# Patient Record
Sex: FEMALE | Race: WHITE | Employment: OTHER | URBAN - METROPOLITAN AREA
[De-identification: names, ages, dates, MRNs, and addresses within clinical notes are randomized per-mention and may not be internally consistent; named-entity substitution may affect disease eponyms.]

---

## 2018-03-17 LAB
CREATININE, EXTERNAL: 0.92
HBA1C MFR BLD HPLC: 5.7 %
LDL-C, EXTERNAL: 68

## 2018-07-24 ENCOUNTER — OFFICE VISIT (OUTPATIENT)
Dept: ENDOCRINOLOGY | Age: 78
End: 2018-07-24

## 2018-07-24 VITALS
WEIGHT: 96 LBS | RESPIRATION RATE: 16 BRPM | DIASTOLIC BLOOD PRESSURE: 61 MMHG | HEART RATE: 66 BPM | TEMPERATURE: 97.8 F | SYSTOLIC BLOOD PRESSURE: 147 MMHG | BODY MASS INDEX: 17.66 KG/M2 | HEIGHT: 62 IN

## 2018-07-24 DIAGNOSIS — E06.3 HYPOTHYROIDISM DUE TO HASHIMOTO'S THYROIDITIS: ICD-10-CM

## 2018-07-24 DIAGNOSIS — E06.3 HYPOTHYROIDISM DUE TO HASHIMOTO'S THYROIDITIS: Primary | ICD-10-CM

## 2018-07-24 DIAGNOSIS — E03.8 HYPOTHYROIDISM DUE TO HASHIMOTO'S THYROIDITIS: Primary | ICD-10-CM

## 2018-07-24 DIAGNOSIS — E03.8 HYPOTHYROIDISM DUE TO HASHIMOTO'S THYROIDITIS: ICD-10-CM

## 2018-07-24 RX ORDER — LIOTHYRONINE SODIUM 50 UG/1
75 TABLET ORAL DAILY
COMMUNITY
End: 2018-07-24 | Stop reason: DRUGHIGH

## 2018-07-24 RX ORDER — LIOTHYRONINE SODIUM 5 UG/1
5 TABLET ORAL DAILY
Qty: 30 TAB | Refills: 6 | Status: SHIPPED | OUTPATIENT
Start: 2018-07-24 | End: 2018-07-24 | Stop reason: SDUPTHER

## 2018-07-24 RX ORDER — BISOPROLOL FUMARATE 5 MG/1
5 TABLET ORAL DAILY
COMMUNITY
End: 2021-11-15

## 2018-07-24 RX ORDER — LEVOTHYROXINE SODIUM 50 UG/1
50 TABLET ORAL
Qty: 45 TAB | Refills: 6 | Status: SHIPPED | OUTPATIENT
Start: 2018-07-24 | End: 2018-07-24 | Stop reason: SDUPTHER

## 2018-07-24 RX ORDER — LEVOTHYROXINE SODIUM 50 UG/1
50 TABLET ORAL
Qty: 45 TAB | Refills: 6 | Status: SHIPPED | OUTPATIENT
Start: 2018-07-24 | End: 2018-10-17

## 2018-07-24 RX ORDER — LIOTHYRONINE SODIUM 5 UG/1
5 TABLET ORAL DAILY
Qty: 30 TAB | Refills: 6 | Status: SHIPPED | OUTPATIENT
Start: 2018-07-24 | End: 2018-10-17 | Stop reason: SDUPTHER

## 2018-07-24 NOTE — PROGRESS NOTES
Chief Complaint   Patient presents with    New Patient    Thyroid Problem     Pt feels that Dr Kelly Gerard is making too many changes in her thyroid medications, and would like a second opinion

## 2018-07-24 NOTE — MR AVS SNAPSHOT
[address] [facility] [address] 
[phone] Patient: Asael Anand MRN: [id number] MSD:2/85/[id number] Visit Information Date & Time Provider Department Dept. Phone Encounter #  
 7/24/2018 11:00 AM Autumn Munoz MD [facility] Diabetes & Endocrinology [phone] [id number] Upcoming Health Maintenance Date Due HEMOGLOBIN A1C Q6M 1942 LIPID PANEL Q1 1942 FOOT EXAM Q1 9/10/1952 MICROALBUMIN Q1 9/10/1952 EYE EXAM RETINAL OR DILATED Q1 9/10/1952 DTaP/Tdap/Td series (1 - Tdap) 9/10/1963 ZOSTER VACCINE AGE 60> 7/10/2002 GLAUCOMA SCREENING Q2Y 9/10/2007 Bone Densitometry (Dexa) Screening 9/10/2007 Pneumococcal 65+ Low/Medium Risk (1 of 2 - PCV13) 9/10/2007 MEDICARE YEARLY EXAM 3/14/2018 Influenza Age 5 to Adult 8/1/2018 Allergies as of 7/24/2018  Review Complete On: 7/24/2018 By: Autumn Munoz MD  
  
 Severity Noted Reaction Type Reactions Aspirin  11/05/2010    Other (comments) Codeine  11/05/2010    Other (comments) Morphine  11/05/2010    Other (comments) Pcn [Penicillins]  11/05/2010    Other (comments) Current Immunizations  Never Reviewed No immunizations on file. Not reviewed this visit You Were Diagnosed With   
  
 Codes Comments Hypothyroidism due to Hashimoto's thyroiditis    -  Primary ICD-10-CM: E03.8, E06.3 ICD-9-CM: 244.8, 245.2 Vitals BP Pulse Temp Resp Height(growth percentile) Weight(growth percentile) 147/61 (BP 1 Location: Left arm, BP Patient Position: Sitting) 66 97.8 °F (36.6 °C) (Oral) 16 5' 2\" (1.575 m) 96 lb (43.5 kg) BMI OB Status Smoking Status 17.56 kg/m2 Hysterectomy Current Every Day Smoker BMI and BSA Data Body Mass Index Body Surface Area  
 17.56 kg/m 2 1.38 m 2 Preferred Pharmacy Pharmacy Name Phone  [facility] [address] [facility] [address] [phone] Your Updated Medication List  
  
   
This list is accurate as of 7/24/18 11:59 AM.  Always use your most recent med list.  
  
  
  
  
 bisoprolol 5 mg tablet Commonly known as:  Arby Wenceslao Take 5 mg by mouth daily. JANUVIA 100 mg tablet Generic drug:  SITagliptin Take 100 mg by mouth daily. levothyroxine 50 mcg tablet Commonly known as:  SYNTHROID Take 1 Tab by mouth Daily (before breakfast). But on fridays, saturdays and sundays  Take 2 pills  
  
 liothyronine 5 mcg tablet Commonly known as:  CYTOMEL Take 1 Tab by mouth daily. Stop 50 mcg cytomel NEXIUM PO Take 40 mg by mouth. PLAVIX 75 mg Tab Generic drug:  clopidogrel Take  by mouth daily. Ramipril 2.5 mg Tab Take 2.5 mg by mouth daily. VITAMIN B-12 PO Take  by mouth. Prescriptions Sent to Pharmacy Refills  
 levothyroxine (SYNTHROID) 50 mcg tablet 6 Sig: Take 1 Tab by mouth Daily (before breakfast). But on fridays, saturdays and sundays  Take 2 pills Class: Normal  
 Pharmacy: [address] Ph #: [phone] Route: Oral  
 liothyronine (CYTOMEL) 5 mcg tablet 6 Sig: Take 1 Tab by mouth daily. Stop 50 mcg cytomel Class: Normal  
 Pharmacy: [address] Ph #: [phone] Route: Oral  
  
Patient Instructions Start on   50 mcg  Synthroid   One pill daily, but on  Friday, sat, sun   2 pills Along with cytomel 5 mcg a day  
on empty stomach with water only, no other meds or food or drinks   For next half hour Take any kind of vitamins, calcium, iron   Pills  4 hours later Introducing [facility]! Bhavana Mayer introduces [email or web address] patient portal. Now you can access parts of your medical record, email your doctor's office, and request medication refills online. 1. In your internet browser, go to [email or web address] 2. Click on the First Time User? Click Here link in the Sign In box. You will see the New Member Sign Up page. 3. Enter your [employer] Access Code exactly as it appears below. You will not need to use this code after youve completed the sign-up process. If you do not sign up before the expiration date, you must request a new code. · [employer] Access Code: [email or web address] Expires: 10/22/2018 11:59 AM 
 
4. Enter the last four digits of your Social Security Number (xxxx) and Date of Birth (mm/dd/yyyy) as indicated and click Submit. You will be taken to the next sign-up page. 5. Create a [employer] ID. This will be your [employer] login ID and cannot be changed, so think of one that is secure and easy to remember. 6. Create a [employer] password. You can change your password at any time. 7. Enter your Password Reset Question and Answer. This can be used at a later time if you forget your password. 8. Enter your e-mail address. You will receive e-mail notification when new information is available in [address] 9. Click Sign Up. You can now view and download portions of your medical record. 10. Click the Download Summary menu link to download a portable copy of your medical information. If you have questions, please visit the Frequently Asked Questions section of the [employer] website. Remember, [employer] is NOT to be used for urgent needs. For medical emergencies, dial 911. Now available from your iPhone and Android! Please provide this summary of care documentation to your next provider. Your primary care clinician is listed as Leslie Traore. If you have any questions after today's visit, please call [phone].

## 2018-07-24 NOTE — PATIENT INSTRUCTIONS
Start on   50 mcg  Synthroid   One pill daily, but on  Friday, sat, sun   2 pills   Along with cytomel 5 mcg a day   on empty stomach with water only, no other meds or food or drinks   For next half hour     Take any kind of vitamins, calcium, iron   Pills  4 hours later

## 2018-07-24 NOTE — PROGRESS NOTES
HISTORY OF PRESENT ILLNESS  Jackson Wong is a 78 y.o. female. HPI  Initial visit for hypothyroidism management     Pt has been on cytomel 50 mcg bid   She had labs done periodically and she had the cytomel and synthroid adjusted regularly     She is c/o inability to gain weight   She denies any other hyperthyroid symptoms     She had cad , denies any anginal pain , sob      Past Medical History:   Diagnosis Date    CAD (coronary artery disease)     Diabetes ([facility] [address] [facility]     Essential hypertension     Gastroesophageal reflux disease     Hyperlipidemia     Hypothyroid     Myocardial infarction ([facility]     Osteoarthritis     Peptic ulcer disease     Vitamin B12 deficiency        Social History     Social History    Marital status:      Spouse name: N/A    Number of children: N/A    Years of education: N/A     Occupational History    Not on file. Social History Main Topics    Smoking status: Current Every Day Smoker    Smokeless tobacco: Never Used    Alcohol use No    Drug use: No    Sexual activity: Not on file     Other Topics Concern    Not on file     Social History Narrative       Family History   Problem Relation Age of Onset    Heart Attack Mother     Hypertension Mother     Diabetes Mother     Colon Cancer Father     Heart Surgery Father        Review of Systems   Constitutional: Positive for malaise/fatigue and weight loss. HENT: Negative. Eyes: Negative for pain and redness. Respiratory: Negative. Cardiovascular: Negative for chest pain, palpitations and leg swelling. Gastrointestinal: Negative. Negative for constipation. Genitourinary: Negative. Musculoskeletal: Negative for myalgias. Skin: Negative. Neurological: Negative. Endo/Heme/Allergies: Negative. Psychiatric/Behavioral: Negative for depression and memory loss. The patient does not have insomnia. Physical Exam   Constitutional: She is oriented to person, place, and time.  She appears well-developed and well-nourished. HENT:   Head: Normocephalic. Eyes: Conjunctivae and EOM are normal. Pupils are equal, round, and reactive to light. Neck: Normal range of motion. Neck supple. No JVD present. No tracheal deviation present. No thyromegaly present. Cardiovascular: Normal rate, regular rhythm and normal heart sounds. No murmur heard. Pulmonary/Chest: Breath sounds normal.   Abdominal: Soft. Bowel sounds are normal.   Musculoskeletal: Normal range of motion. Lymphadenopathy:     She has no cervical adenopathy. Neurological: She is alert and oriented to person, place, and time. She has normal reflexes. Skin: Skin is warm. Psychiatric: She has a normal mood and affect. Reviewed all the labs from pcp    ASSESSMENT and PLAN    1.  Hypothyroidism : pt has long been on cytomel , current dose is 50 mcg twice a day   She says her body is intolerant of synthroid   She says she is not allergic to it     HER TSH levels are very fluctuant -   July 19 2018   TSh was 36.7   June 11 2018   TSH was 0.618   March 2018 was  11.27   Feb 2018 TSH was 0.262  TSH was 55 from dec 2017       After long conversation , she got convinced to try  Synthroid plain 50 mcg dose pill ( chose this only to avoid any potential dye allergies )  Calc dose by her body weight   And dropped cytomel down to 5 mcg a day only     Reassured her as transition is going to be [facility] her on proper intake and will f/u on labs more regularly     > 50 % of time is spent on counseling   Patient voiced understanding her plan of care

## 2018-08-14 ENCOUNTER — OP HISTORICAL/CONVERTED ENCOUNTER (OUTPATIENT)
Dept: OTHER | Age: 78
End: 2018-08-14

## 2018-09-06 ENCOUNTER — HOSPITAL ENCOUNTER (OUTPATIENT)
Dept: LAB | Age: 78
Discharge: HOME | End: 2018-09-06
Payer: MEDICARE

## 2018-09-06 ENCOUNTER — OFFICE VISIT (OUTPATIENT)
Dept: ENDOCRINOLOGY | Age: 78
End: 2018-09-06

## 2018-09-06 VITALS
DIASTOLIC BLOOD PRESSURE: 57 MMHG | HEART RATE: 68 BPM | TEMPERATURE: 97.2 F | WEIGHT: 101.6 LBS | BODY MASS INDEX: 18.69 KG/M2 | RESPIRATION RATE: 18 BRPM | OXYGEN SATURATION: 96 % | HEIGHT: 62 IN | SYSTOLIC BLOOD PRESSURE: 150 MMHG

## 2018-09-06 DIAGNOSIS — E03.4 HYPOTHYROIDISM DUE TO ACQUIRED ATROPHY OF THYROID: Primary | ICD-10-CM

## 2018-09-06 PROCEDURE — 84439 ASSAY OF FREE THYROXINE: CPT

## 2018-09-06 PROCEDURE — 36415 COLL VENOUS BLD VENIPUNCTURE: CPT

## 2018-09-06 PROCEDURE — 84443 ASSAY THYROID STIM HORMONE: CPT

## 2018-09-06 PROCEDURE — 86376 MICROSOMAL ANTIBODY EACH: CPT

## 2018-09-06 NOTE — PROGRESS NOTES
1. Have you been to the ER, urgent care clinic since your last visit? No  Hospitalized since your last visit? NO 
 
2. Have you seen or consulted any other health care providers outside of the [facility] since your last visit? No 
 
Wt Readings from Last 3 Encounters:  
09/06/18 101 lb 9.6 oz (46.1 kg) 07/24/18 96 lb (43.5 kg) 05/24/11 116 lb (52.6 kg) Temp Readings from Last 3 Encounters:  
09/06/18 97.2 °F (36.2 °C) (Oral)  
07/24/18 97.8 °F (36.6 °C) (Oral) BP Readings from Last 3 Encounters:  
09/06/18 150/57  
07/24/18 147/61  
05/24/11 92/48 Pulse Readings from Last 3 Encounters:  
09/06/18 68  
07/24/18 66  
05/24/11 88

## 2018-09-06 NOTE — PROGRESS NOTES
HISTORY OF PRESENT ILLNESS Emilee Rubio is a 78 y.o. female. HPI First f/u after Initial visit for hypothyroidism management   From July 2018 She feels not good at all She is not taking the meds as prescribed , got confused But gladly toleratign synthroid Old history Pt has been on cytomel 50 mcg bid She had labs done periodically and she had the cytomel and synthroid adjusted regularly She is c/o inability to gain weight She denies any other hyperthyroid symptoms She had cad , denies any anginal pain , sob Past Medical History:  
Diagnosis Date  CAD (coronary artery disease)  Diabetes ([facility] [address]  Essential hypertension  Gastroesophageal reflux disease  Hyperlipidemia  Hypothyroid  Myocardial infarction ([facility] [address] [facility]  Osteoarthritis  Peptic ulcer disease  Vitamin B12 deficiency Social History Social History  Marital status:  Spouse name: N/A  
 Number of children: N/A  
 Years of education: N/A Occupational History  Not on file. Social History Main Topics  Smoking status: Current Every Day Smoker  Smokeless tobacco: Never Used  Alcohol use No  
 Drug use: No  
 Sexual activity: Not on file Other Topics Concern  Not on file Social History Narrative Family History Problem Relation Age of Onset  Heart Attack Mother  Hypertension Mother  Diabetes Mother  Colon Cancer Father  Heart Surgery Father Review of Systems Constitutional: Positive for malaise/fatigue and weight loss. HENT: Negative. Eyes: Negative for pain and redness. Respiratory: Negative. Cardiovascular: Negative for chest pain, palpitations and leg swelling. Gastrointestinal: Negative. Negative for constipation. Genitourinary: Negative. Musculoskeletal: Negative for myalgias. Skin: Negative. Neurological: Negative. Endo/Heme/Allergies: Negative. Psychiatric/Behavioral: Negative for depression and memory loss. The patient does not have insomnia. Physical Exam  
Constitutional: She is oriented to person, place, and time. She appears well-developed and well-nourished. HENT:  
Head: Normocephalic. Eyes: Conjunctivae and EOM are normal. Pupils are equal, round, and reactive to light. Neck: Normal range of motion. Neck supple. No JVD present. No tracheal deviation present. No thyromegaly present. Cardiovascular: Normal rate, regular rhythm and normal heart sounds. No murmur heard. Pulmonary/Chest: Breath sounds normal.  
Abdominal: Soft. Bowel sounds are normal.  
Musculoskeletal: Normal range of motion. Lymphadenopathy:  
  She has no cervical adenopathy. Neurological: She is alert and oriented to person, place, and time. She has normal reflexes. Skin: Skin is warm. Psychiatric: She has a normal mood and affect. Reviewed all the labs from pcp ASSESSMENT and PLAN 1. Hypothyroidism : pt has long been on cytomel , she took  50 mcg twice a day until she came to see me in July 2018 She says her body is intolerant of synthroid She says she is not allergic to it but to be on safe side, I used only plain 50 mcg pills This visit, sept 2018 - she got confused and has been using synthroid 50 mcg for 4 days and cytomel 5 for 3 days ( lower than given dose ) HER TSH levels are very fluctuant -  
July 19 2018   TSh was 36.7 June 11 2018   TSH was 0.618 March 2018 was  11.27 Feb 2018 TSH was 0.262 TSH was 55 from dec 2017 After long conversation , she got convinced to try  Synthroid plain 50 mcg dose pill ( chose this only to avoid any potential dye allergies ) Calc dose by her body weight 75 mcg a day Synthroid 10 pills a week = 71 mcg a day plus  dropped cytomel down to 5 mcg a day Today, corrected her and I gave her pictorally the plan to take the meds Will change once she is EUTHYROID  
 
 Reassured her as transition is going to be [facility] Educated her on proper intake and will f/u on labs more regularly  
 
> 50 % of time is spent on counseling Patient voiced understanding her plan of care

## 2018-09-06 NOTE — PATIENT INSTRUCTIONS
-------------------------------------------------------------------------------------------- Refills    -    please call your pharmacy and have them send us a refill request 
 
Results  -  allow up to a week for lab results to be processed and reviewed. Phone calls  -  Allow upto 24 hrs. for non-urgent calls to be retained Prior authorization - It may take up to 2 weeks to process, depending on your insurance Forms  -  FMLA, DMV, patient assistance, etc. will take up to 2 weeks to process Cancellations - please notify the office in advance if you cannot keep your appointment Samples  - will only be dispensed at visits as supply is limited If you are having a medical emergency call 911 
 
-------------------------------------------------------------------------------------------- Start on   50 mcg  Synthroid   One pill daily, but on  Friday, sat, sun   2 pills Along with cytomel 5 mcg a day  
on empty stomach with water only, no other meds or food or drinks   For next half hour Take any kind of vitamins, calcium, iron   Pills  4 hours later

## 2018-09-06 NOTE — MR AVS SNAPSHOT
[address] [place] [address] 
[phone] Patient: Johnie Nagel MRN: [id number] GJZ:[birth year] Visit Information Date & Time Provider Department Dept. Phone Encounter #  
 9/6/2018  3:15 PM Afia Tabor MD [facility] Diabetes & Endocrinology [phone] [id number] Follow-up Instructions Return in about 6 weeks (around 10/18/2018). Upcoming Health Maintenance Date Due  
 FOOT EXAM Q1 9/10/1952 MICROALBUMIN Q1 9/10/1952 EYE EXAM RETINAL OR DILATED Q1 9/10/1952 DTaP/Tdap/Td series (1 - Tdap) 9/10/1963 ZOSTER VACCINE AGE 60> 7/10/2002 GLAUCOMA SCREENING Q2Y 9/10/2007 Bone Densitometry (Dexa) Screening 9/10/2007 Pneumococcal 65+ Low/Medium Risk (1 of 2 - PCV13) 9/10/2007 MEDICARE YEARLY EXAM 3/14/2018 Influenza Age 5 to Adult 8/1/2018 HEMOGLOBIN A1C Q6M 9/17/2018 LIPID PANEL Q1 3/17/2019 Allergies as of 9/6/2018  Review Complete On: 9/6/2018 By: Afia Tabor MD  
  
 Severity Noted Reaction Type Reactions Aspirin  11/05/2010    Other (comments) Codeine  11/05/2010    Other (comments) Morphine  11/05/2010    Other (comments) Pcn [Penicillins]  11/05/2010    Other (comments) Current Immunizations  Never Reviewed No immunizations on file. Not reviewed this visit Vitals BP Pulse Temp Resp Height(growth percentile) Weight(growth percentile) 150/57 (BP 1 Location: Right arm, BP Patient Position: Sitting) 68 97.2 °F (36.2 °C) (Oral) 18 5' 2\" (1.575 m) 101 lb 9.6 oz (46.1 kg) SpO2 BMI OB Status Smoking Status 96% 18.58 kg/m2 Hysterectomy Current Every Day Smoker BMI and BSA Data Body Mass Index Body Surface Area 18.58 kg/m 2 1.42 m 2 Preferred Pharmacy Pharmacy Name Phone [facility] [address] [phone] Your Updated Medication List  
  
   
 This list is accurate as of 9/6/18  3:43 PM.  Always use your most recent med list.  
  
  
  
  
 bisoprolol 5 mg tablet Commonly known as:  Thresea Tony Take 5 mg by mouth daily. JANUVIA 100 mg tablet Generic drug:  SITagliptin Take 100 mg by mouth daily. levothyroxine 50 mcg tablet Commonly known as:  SYNTHROID Take 1 Tab by mouth Daily (before breakfast). But on fridays, saturdays and sundays  Take 2 pills  
  
 liothyronine 5 mcg tablet Commonly known as:  CYTOMEL Take 1 Tab by mouth daily. Stop 50 mcg cytomel NEXIUM PO Take 40 mg by mouth. PLAVIX 75 mg Tab Generic drug:  clopidogrel Take  by mouth daily. Ramipril 2.5 mg Tab Take 2.5 mg by mouth daily. VITAMIN B-12 PO Take  by mouth. Follow-up Instructions Return in about 6 weeks (around 10/18/2018). Patient Instructions   
-------------------------------------------------------------------------------------------- Refills    -    please call your pharmacy and have them send us a refill request 
 
Results  -  allow up to a week for lab results to be processed and reviewed. Phone calls  -  Allow upto 24 hrs. for non-urgent calls to be retained Prior authorization - It may take up to 2 weeks to process, depending on your insurance Forms  -  FMLA, DMV, patient assistance, etc. will take up to 2 weeks to process Cancellations - please notify the office in advance if you cannot keep your appointment Samples  - will only be dispensed at visits as supply is limited If you are having a medical emergency call 911 
 
-------------------------------------------------------------------------------------------- Start on   50 mcg  Synthroid   One pill daily, but on  Friday, sat, sun   2 pills Along with cytomel 5 mcg a day  
on empty stomach with water only, no other meds or food or drinks   For next half hour Take any kind of vitamins, calcium, iron   Pills  4 hours later Introducing [facility]! [employer] introduces [employer] patient portal. Now you can access parts of your medical record, email your doctor's office, and request medication refills online. 1. In your internet browser, go to [email or web address] 2. Click on the First Time User? Click Here link in the Sign In box. You will see the New Member Sign Up page. 3. Enter your [employer] Access Code exactly as it appears below. You will not need to use this code after youve completed the sign-up process. If you do not sign up before the expiration date, you must request a new code. · [employer] Access Code: [email or web address] Expires: 10/22/2018 11:59 AM 
 
4. Enter the last four digits of your Social Security Number (xxxx) and Date of Birth (mm/dd/yyyy) as indicated and click Submit. You will be taken to the next sign-up page. 5. Create a [employer] ID. This will be your [employer] login ID and cannot be changed, so think of one that is secure and easy to remember. 6. Create a [employer] password. You can change your password at any time. 7. Enter your Password Reset Question and Answer. This can be used at a later time if you forget your password. 8. Enter your e-mail address. You will receive e-mail notification when new information is available in [address] 9. Click Sign Up. You can now view and download portions of your medical record. 10. Click the Download Summary menu link to download a portable copy of your medical information. If you have questions, please visit the Frequently Asked Questions section of the [employer] website. Remember, [employer] is NOT to be used for urgent needs. For medical emergencies, dial 911. Now available from your iPhone and Android! Please provide this summary of care documentation to your next provider. Your primary care clinician is listed as Edelmira Weaver.  If you have any questions after today's visit, please call [phone].

## 2018-09-07 DIAGNOSIS — E03.9 ACQUIRED HYPOTHYROIDISM: Primary | ICD-10-CM

## 2018-09-07 LAB
T4 FREE SERPL-MCNC: 0.54 NG/DL (ref 0.82–1.77)
THYROPEROXIDASE AB SERPL-ACNC: 18 IU/ML (ref 0–34)
TSH SERPL DL<=0.005 MIU/L-ACNC: 81.67 UIU/ML (ref 0.45–4.5)

## 2018-09-07 RX ORDER — LEVOTHYROXINE SODIUM 50 UG/1
50 TABLET ORAL
Qty: 30 TAB | Refills: 6 | Status: SHIPPED | OUTPATIENT
Start: 2018-09-07 | End: 2018-10-17 | Stop reason: SDUPTHER

## 2018-09-07 RX ORDER — LIOTHYRONINE SODIUM 5 UG/1
5 TABLET ORAL DAILY
Qty: 30 TAB | Refills: 6 | Status: SHIPPED | OUTPATIENT
Start: 2018-09-07 | End: 2018-09-07 | Stop reason: SDUPTHER

## 2018-09-07 RX ORDER — LEVOTHYROXINE SODIUM 50 UG/1
50 TABLET ORAL
Qty: 30 TAB | Refills: 6 | Status: SHIPPED | OUTPATIENT
Start: 2018-09-07 | End: 2018-09-07 | Stop reason: SDUPTHER

## 2018-09-07 RX ORDER — LIOTHYRONINE SODIUM 5 UG/1
5 TABLET ORAL DAILY
Qty: 30 TAB | Refills: 6 | Status: SHIPPED | OUTPATIENT
Start: 2018-09-07 | End: 2018-10-17 | Stop reason: SDUPTHER

## 2018-09-10 NOTE — PROGRESS NOTES
Inform pt that because of NOT TAKING THE MEDICATION, THE LEVELS HAVE GOTTEN WORSER  
 
EXPECTED, DISCUSSED WITH HER AGAIN ON HOW TO TAKE MEDS . Luz Robles AGAIN Afia Tabor MD

## 2018-10-10 ENCOUNTER — HOSPITAL ENCOUNTER (OUTPATIENT)
Dept: LAB | Age: 78
Discharge: HOME | End: 2018-10-10
Payer: MEDICARE

## 2018-10-10 PROCEDURE — 84439 ASSAY OF FREE THYROXINE: CPT

## 2018-10-10 PROCEDURE — 84443 ASSAY THYROID STIM HORMONE: CPT

## 2018-10-10 PROCEDURE — 36415 COLL VENOUS BLD VENIPUNCTURE: CPT

## 2018-10-10 PROCEDURE — 84481 FREE ASSAY (FT-3): CPT

## 2018-10-17 ENCOUNTER — OFFICE VISIT (OUTPATIENT)
Dept: ENDOCRINOLOGY | Age: 78
End: 2018-10-17

## 2018-10-17 VITALS
HEART RATE: 86 BPM | OXYGEN SATURATION: 97 % | SYSTOLIC BLOOD PRESSURE: 169 MMHG | RESPIRATION RATE: 18 BRPM | TEMPERATURE: 97.3 F | BODY MASS INDEX: 18.99 KG/M2 | WEIGHT: 103.2 LBS | DIASTOLIC BLOOD PRESSURE: 64 MMHG | HEIGHT: 62 IN

## 2018-10-17 DIAGNOSIS — E06.3 HYPOTHYROIDISM DUE TO HASHIMOTO'S THYROIDITIS: ICD-10-CM

## 2018-10-17 DIAGNOSIS — E03.8 HYPOTHYROIDISM DUE TO HASHIMOTO'S THYROIDITIS: ICD-10-CM

## 2018-10-17 RX ORDER — LEVOTHYROXINE SODIUM 50 UG/1
50 TABLET ORAL
Qty: 50 TAB | Refills: 6 | Status: SHIPPED | OUTPATIENT
Start: 2018-10-17 | End: 2018-12-19 | Stop reason: SDUPTHER

## 2018-10-17 RX ORDER — LIOTHYRONINE SODIUM 5 UG/1
5 TABLET ORAL DAILY
Qty: 30 TAB | Refills: 6 | Status: SHIPPED | OUTPATIENT
Start: 2018-10-17 | End: 2018-12-19 | Stop reason: SDUPTHER

## 2018-10-17 NOTE — PROGRESS NOTES
1. Have you been to the ER, urgent care clinic since your last visit? Hospitalized since your last visit? No 
 
2. Have you seen or consulted any other health care providers outside of the [address] [place] [address] since your last visit? Include any pap smears or colon screening. No  
 
Wt Readings from Last 3 Encounters:  
10/17/18 103 lb 3.2 oz (46.8 kg) 09/06/18 101 lb 9.6 oz (46.1 kg) 07/24/18 96 lb (43.5 kg) Temp Readings from Last 3 Encounters:  
10/17/18 97.3 °F (36.3 °C) (Oral) 09/06/18 97.2 °F (36.2 °C) (Oral)  
07/24/18 97.8 °F (36.6 °C) (Oral) BP Readings from Last 3 Encounters:  
10/17/18 169/64  
09/06/18 150/57  
07/24/18 147/61 Pulse Readings from Last 3 Encounters:  
10/17/18 86  
09/06/18 68  
07/24/18 66

## 2018-10-17 NOTE — PATIENT INSTRUCTIONS
-------------------------------------------------------------------------------------------- Refills    -    please call your pharmacy and have them send us a refill request 
 
Results  -  allow up to a week for lab results to be processed and reviewed. Phone calls  -  Allow upto 24 hrs. for non-urgent calls to be retained Prior authorization - It may take up to 4 weeks to process, depending on your insurance Forms  -  FMLA, DMV, patient assistance, etc. will take up to 2 weeks to process Cancellations - please notify the office in advance if you cannot keep your appointment Samples  - will only be dispensed at visits as supply is limited If you are having a medical emergency call 911 
 
-------------------------------------------------------------------------------------------- 
 
 
 
-------------------------------------------------------------------------------------------- Refills    -    please call your pharmacy and have them send us a refill request 
 
Results  -  allow up to a week for lab results to be processed and reviewed. Phone calls  -  Allow upto 24 hrs. for non-urgent calls to be retained Prior authorization - It may take up to 2 weeks to process, depending on your insurance Forms  -  FMLA, DMV, patient assistance, etc. will take up to 2 weeks to process Cancellations - please notify the office in advance if you cannot keep your appointment Samples  - will only be dispensed at visits as supply is limited If you are having a medical emergency call 911 
 
-------------------------------------------------------------------------------------------- 
 
INCREASE ON    50 mcg  Synthroid   One pill daily, but on  Thursday , Friday, Sat, Sun   2 pills Along with cytomel 5 mcg a day  
 
on empty stomach with water only, no other meds or food or drinks   For next half hour Take any kind of vitamins, calcium, iron   Pills  4 hours later

## 2018-10-17 NOTE — PROGRESS NOTES
HISTORY OF PRESENT ILLNESS Chago Medina is a 70 y.o. female. HPI 
 f/u after last  visit for hypothyroidism management   From Sept 6 2018 She does not feel good still Not able to sleep Has some weakness in her legs Old history : 
 
She feels not good at all She is not taking the meds as prescribed , got confused But gladly toleratign synthroid Old history Pt has been on cytomel 50 mcg bid She had labs done periodically and she had the cytomel and synthroid adjusted regularly She is c/o inability to gain weight She denies any other hyperthyroid symptoms She had cad , denies any anginal pain , sob Past Medical History:  
Diagnosis Date  CAD (coronary artery disease)  Diabetes ([facility]  Essential hypertension  Gastroesophageal reflux disease  Hyperlipidemia  Hypothyroid  Myocardial infarction ([facility]  Osteoarthritis  Peptic ulcer disease  Vitamin B12 deficiency Social History Socioeconomic History  Marital status:  Spouse name: Not on file  Number of children: Not on file  Years of education: Not on file  Highest education level: Not on file Social Needs  Financial resource strain: Not on file  Food insecurity - worry: Not on file  Food insecurity - inability: Not on file  Transportation needs - medical: Not on file  Transportation needs - non-medical: Not on file Occupational History  Not on file Tobacco Use  Smoking status: Current Every Day Smoker  Smokeless tobacco: Never Used Substance and Sexual Activity  Alcohol use: No  
 Drug use: No  
 Sexual activity: Not on file Other Topics Concern  Not on file Social History Narrative  Not on file Family History Problem Relation Age of Onset  Heart Attack Mother  Hypertension Mother  Diabetes Mother  Colon Cancer Father  Heart Surgery Father Review of Systems Constitutional: Positive for malaise/fatigue and weight loss. HENT: Negative. Eyes: Negative for pain and redness. Respiratory: Negative. Cardiovascular: Negative for chest pain, palpitations and leg swelling. Gastrointestinal: Negative. Negative for constipation. Genitourinary: Negative. Musculoskeletal: Negative for myalgias. Skin: Negative. Neurological: Negative. Endo/Heme/Allergies: Negative. Psychiatric/Behavioral: Negative for depression and memory loss. The patient does not have insomnia. Physical Exam  
Constitutional: She is oriented to person, place, and time. She appears well-developed and well-nourished. HENT:  
Head: Normocephalic. Eyes: Conjunctivae and EOM are normal. Pupils are equal, round, and reactive to light. Neck: Normal range of motion. Neck supple. No JVD present. No tracheal deviation present. No thyromegaly present. Cardiovascular: Normal rate, regular rhythm and normal heart sounds. No murmur heard. Pulmonary/Chest: Breath sounds normal.  
Abdominal: Soft. Bowel sounds are normal.  
Musculoskeletal: Normal range of motion. Lymphadenopathy:  
  She has no cervical adenopathy. Neurological: She is alert and oriented to person, place, and time. She has normal reflexes. Skin: Skin is warm. Psychiatric: She has a normal mood and affect. Lab Results Component Value Date/Time TSH 16.220 (H) 10/10/2018 10:47 AM  
 Triiodothyronine (T3), free 2.5 10/10/2018 10:47 AM  
 T4, Free 0.80 (L) 10/10/2018 10:47 AM  
  
 
 
ASSESSMENT and PLAN 1. Hypothyroidism : pt has long been on cytomel , she took  50 mcg twice a day until she came to see me in July 2018 She says her body is intolerant of synthroid She says she is not allergic to it but to be on safe side, I used only plain 50 mcg pills This visit, sept 2018 - she got confused and has been using synthroid 50 mcg for 4 days and cytomel 5 for 3 days ( lower than given dose ) HER TSH levels are very fluctuant -  
July 19 2018   TSh was 36.7 June 11 2018   TSH was 0.618 March 2018 was  11.27 Feb 2018 TSH was 0.262 TSH was 55 from dec 2017 After long conversation , she got convinced to try  Synthroid plain 50 mcg dose pill ( chose this only to avoid any potential dye allergies ) Calc dose by her body weight 75 mcg a day Synthroid 10 pills a week = 71 mcg a day plus  I dropped cytomel down to 5 mcg a day July 2018 visit , corrected her and I gave her pictorally the plan to take the meds Reassured her as transition is going to be [facility] Educated her on proper intake Oct 2018 : her TSH is down to 16  On the above dosage Still continuing on synthroid 50 mcg dose, but increased to an extra pill 4 days a week That means synthroid of 78 mcg plus cytomel of 5 mcg dose  
 
> 50 % of time is spent on counseling Patient voiced understanding her plan of care

## 2018-12-15 ENCOUNTER — HOSPITAL ENCOUNTER (OUTPATIENT)
Dept: LAB | Age: 78
Discharge: HOME | End: 2018-12-15
Payer: MEDICARE

## 2018-12-15 PROCEDURE — 84439 ASSAY OF FREE THYROXINE: CPT

## 2018-12-15 PROCEDURE — 36415 COLL VENOUS BLD VENIPUNCTURE: CPT

## 2018-12-15 PROCEDURE — 84443 ASSAY THYROID STIM HORMONE: CPT

## 2018-12-16 LAB
T4 FREE SERPL-MCNC: 0.99 NG/DL (ref 0.82–1.77)
TSH SERPL DL<=0.005 MIU/L-ACNC: 21.02 UIU/ML (ref 0.45–4.5)

## 2018-12-19 ENCOUNTER — OFFICE VISIT (OUTPATIENT)
Dept: ENDOCRINOLOGY | Age: 78
End: 2018-12-19

## 2018-12-19 VITALS
DIASTOLIC BLOOD PRESSURE: 55 MMHG | BODY MASS INDEX: 19.54 KG/M2 | SYSTOLIC BLOOD PRESSURE: 153 MMHG | OXYGEN SATURATION: 97 % | HEART RATE: 67 BPM | TEMPERATURE: 95.5 F | RESPIRATION RATE: 18 BRPM | WEIGHT: 106.2 LBS | HEIGHT: 62 IN

## 2018-12-19 DIAGNOSIS — E06.3 HYPOTHYROIDISM DUE TO HASHIMOTO'S THYROIDITIS: ICD-10-CM

## 2018-12-19 DIAGNOSIS — E03.8 HYPOTHYROIDISM DUE TO HASHIMOTO'S THYROIDITIS: ICD-10-CM

## 2018-12-19 DIAGNOSIS — E03.8 HYPOTHYROIDISM DUE TO HASHIMOTO'S THYROIDITIS: Primary | ICD-10-CM

## 2018-12-19 DIAGNOSIS — E06.3 HYPOTHYROIDISM DUE TO HASHIMOTO'S THYROIDITIS: Primary | ICD-10-CM

## 2018-12-19 RX ORDER — LIOTHYRONINE SODIUM 5 UG/1
5 TABLET ORAL DAILY
Qty: 90 TAB | Refills: 4 | Status: SHIPPED | OUTPATIENT
Start: 2018-12-19 | End: 2019-03-19 | Stop reason: SDUPTHER

## 2018-12-19 RX ORDER — LEVOTHYROXINE SODIUM 50 UG/1
100 TABLET ORAL
Qty: 180 TAB | Refills: 4 | Status: SHIPPED | OUTPATIENT
Start: 2018-12-19 | End: 2019-03-19 | Stop reason: SDUPTHER

## 2018-12-19 NOTE — PATIENT INSTRUCTIONS
INCREASE ON  2 pills  50 mcg  Synthroid   One pill daily ( haleigh short  )   Along with cytomel 5 mcg a day ( mail order )    on empty stomach with water only, no other meds or food or drinks   For next half hour     Take any kind of vitamins, calcium, iron   Pills  4 hours later

## 2018-12-19 NOTE — PROGRESS NOTES
HISTORY OF PRESENT ILLNESS  Yoel Escobar is a 70 y.o. female. HPI   f/u after last  visit for hypothyroidism management   From Oct  2018      She does  feel good   Has sleep issues still     Compliant with meds, c/o cost   She is being patient.         Old history :    She feels not good at all   She is not taking the meds as prescribed , got confused   But gladly toleratign synthroid       Old history     Pt has been on cytomel 50 mcg bid   She had labs done periodically and she had the cytomel and synthroid adjusted regularly     She is c/o inability to gain weight   She denies any other hyperthyroid symptoms     She had cad , denies any anginal pain , sob      Past Medical History:   Diagnosis Date    CAD (coronary artery disease)     Diabetes ([facility]     Essential hypertension     Gastroesophageal reflux disease     Hyperlipidemia     Hypothyroid     Myocardial infarction ([facility]     Osteoarthritis     Peptic ulcer disease     Vitamin B12 deficiency        Social History     Socioeconomic History    Marital status:      Spouse name: Not on file    Number of children: Not on file    Years of education: Not on file    Highest education level: Not on file   Social Needs    Financial resource strain: Not on file    Food insecurity - worry: Not on file    Food insecurity - inability: Not on file   [employer] needs - medical: Not on file   [employer] needs - non-medical: Not on file   Occupational History    Not on file   Tobacco Use    Smoking status: Current Every Day Smoker    Smokeless tobacco: Never Used   Substance and Sexual Activity    Alcohol use: No    Drug use: No    Sexual activity: Not on file   Other Topics Concern    Not on file   Social History Narrative    Not on file       Family History   Problem Relation Age of Onset    Heart Attack Mother     Hypertension Mother     Diabetes Mother     Colon Cancer Father     Heart Surgery Father        Review of Systems   Constitutional: Positive for malaise/fatigue and weight loss. HENT: Negative. Eyes: Negative for pain and redness. Respiratory: Negative. Cardiovascular: Negative for chest pain, palpitations and leg swelling. Gastrointestinal: Negative. Negative for constipation. Genitourinary: Negative. Musculoskeletal: Negative for myalgias. Skin: Negative. Neurological: Negative. Endo/Heme/Allergies: Negative. Psychiatric/Behavioral: Negative for depression and memory loss. The patient does not have insomnia. Physical Exam   Constitutional: She is oriented to person, place, and time. She appears well-developed and well-nourished. HENT:   Head: Normocephalic. Eyes: Conjunctivae and EOM are normal. Pupils are equal, round, and reactive to light. Neck: Normal range of motion. Neck supple. No JVD present. No tracheal deviation present. No thyromegaly present. Cardiovascular: Normal rate, regular rhythm and normal heart sounds. No murmur heard. Pulmonary/Chest: Breath sounds normal.   Abdominal: Soft. Bowel sounds are normal.   Musculoskeletal: Normal range of motion. Lymphadenopathy:     She has no cervical adenopathy. Neurological: She is alert and oriented to person, place, and time. She has normal reflexes. Skin: Skin is warm. Psychiatric: She has a normal mood and affect. Lab Results   Component Value Date/Time    TSH 21.020 (H) 12/15/2018 08:59 AM    Triiodothyronine (T3), free 2.5 10/10/2018 10:47 AM    T4, Free 0.99 12/15/2018 08:59 AM          ASSESSMENT and PLAN    1.  Hypothyroidism : pt has long been on cytomel , she took  50 mcg twice a day until she came to see me in July 2018   She says her body is intolerant of synthroid   She says she is not allergic to it but to be on safe side, I used only plain 50 mcg pills     This visit, sept 2018 - she got confused and has been using synthroid 50 mcg for 4 days and cytomel 5 for 3 days ( lower than given dose )      HER TSH levels are very fluctuant -   Dec 2018 : TSH is 21   Oct 11 2018 :TSH is 16   Sept 2018 :  TSH was 81   July 19 2018   TSh was 36.7   June 11 2018   TSH was 0.618   March 2018 was  11.27   Feb 2018 TSH was 0.262  TSH was 55 from dec 2017       After long conversation , she got convinced to try  Synthroid plain 50 mcg dose pill ( chose this only to avoid any potential dye allergies )  Calc dose by her body weight 75 mcg a day   Synthroid 10 pills a week = 71 mcg a day plus  I dropped cytomel down to 5 mcg a day      July 2018 visit , corrected her and I gave her pictorally the plan to take the meds   Reassured her as transition is going to be [facility] her on proper intake       Oct 2018 : her TSH is down to 16  On the above dosage  Still continuing on synthroid 50 mcg dose, but increased to an extra pill 4 days a week    That means synthroid of 78 mcg plus cytomel of 5 mcg dose       Dec 2018 :  TSH is 21   Increase synthroid to 50 mcg 2 pills daily  Along with  cytomel 5 mcg a day     > 50 % of time is spent on counseling   Patient voiced understanding her plan of care

## 2018-12-19 NOTE — PROGRESS NOTES
1. Have you been to the ER, urgent care clinic since your last visit? No  Hospitalized since your last visit? No    2. Have you seen or consulted any other health care providers outside of the [address] [place] [address] since your last visit? Include any pap smears or colon screening.  No     Wt Readings from Last 3 Encounters:   12/19/18 106 lb 3.2 oz (48.2 kg)   10/17/18 103 lb 3.2 oz (46.8 kg)   09/06/18 101 lb 9.6 oz (46.1 kg)     Temp Readings from Last 3 Encounters:   12/19/18 95.5 °F (35.3 °C) (Oral)   10/17/18 97.3 °F (36.3 °C) (Oral)   09/06/18 97.2 °F (36.2 °C) (Oral)     BP Readings from Last 3 Encounters:   12/19/18 153/55   10/17/18 169/64   09/06/18 150/57     Pulse Readings from Last 3 Encounters:   12/19/18 67   10/17/18 86   09/06/18 68

## 2019-02-18 ENCOUNTER — HOSPITAL ENCOUNTER (OUTPATIENT)
Dept: LAB | Age: 79
Discharge: HOME | End: 2019-02-18
Payer: MEDICARE

## 2019-02-18 PROCEDURE — 36415 COLL VENOUS BLD VENIPUNCTURE: CPT

## 2019-02-18 PROCEDURE — 84439 ASSAY OF FREE THYROXINE: CPT

## 2019-02-18 PROCEDURE — 84481 FREE ASSAY (FT-3): CPT

## 2019-02-18 PROCEDURE — 84443 ASSAY THYROID STIM HORMONE: CPT

## 2019-02-19 LAB
T3FREE SERPL-MCNC: 3.4 PG/ML (ref 2–4.4)
T4 FREE SERPL-MCNC: 1.37 NG/DL (ref 0.82–1.77)
TSH SERPL DL<=0.005 MIU/L-ACNC: 2.25 UIU/ML (ref 0.45–4.5)

## 2019-03-19 ENCOUNTER — OFFICE VISIT (OUTPATIENT)
Dept: ENDOCRINOLOGY | Age: 79
End: 2019-03-19

## 2019-03-19 VITALS
HEART RATE: 64 BPM | BODY MASS INDEX: 19.8 KG/M2 | DIASTOLIC BLOOD PRESSURE: 64 MMHG | TEMPERATURE: 97.1 F | OXYGEN SATURATION: 98 % | SYSTOLIC BLOOD PRESSURE: 157 MMHG | HEIGHT: 62 IN | WEIGHT: 107.6 LBS | RESPIRATION RATE: 18 BRPM

## 2019-03-19 DIAGNOSIS — E06.3 HYPOTHYROIDISM DUE TO HASHIMOTO'S THYROIDITIS: Primary | ICD-10-CM

## 2019-03-19 DIAGNOSIS — E03.8 HYPOTHYROIDISM DUE TO HASHIMOTO'S THYROIDITIS: ICD-10-CM

## 2019-03-19 DIAGNOSIS — E06.3 HYPOTHYROIDISM DUE TO HASHIMOTO'S THYROIDITIS: ICD-10-CM

## 2019-03-19 DIAGNOSIS — E03.8 HYPOTHYROIDISM DUE TO HASHIMOTO'S THYROIDITIS: Primary | ICD-10-CM

## 2019-03-19 DIAGNOSIS — M81.0 SENILE OSTEOPOROSIS: ICD-10-CM

## 2019-03-19 RX ORDER — LOSARTAN POTASSIUM 50 MG/1
TABLET ORAL DAILY
COMMUNITY
End: 2021-11-15

## 2019-03-19 RX ORDER — LEVOTHYROXINE SODIUM 50 UG/1
100 TABLET ORAL
Qty: 180 TAB | Refills: 4 | Status: SHIPPED | OUTPATIENT
Start: 2019-03-19 | End: 2019-07-02 | Stop reason: SDUPTHER

## 2019-03-19 RX ORDER — LIOTHYRONINE SODIUM 5 UG/1
5 TABLET ORAL DAILY
Qty: 90 TAB | Refills: 4 | Status: SHIPPED | OUTPATIENT
Start: 2019-03-19 | End: 2019-07-02 | Stop reason: SDUPTHER

## 2019-03-19 NOTE — PROGRESS NOTES
HISTORY OF PRESENT ILLNESS Nick Gardner is a 70 y.o. female. HPI 
 f/u after last  visit for hypothyroidism management   From december 2018 Gained 1 lb COMPLIANT  WITH MEDS  
SHE FEELS HORMONES ARE OFF Old history She does  feel good Has sleep issues still Compliant with meds, c/o cost  
She is being patient. Old history : 
 
She feels not good at all She is not taking the meds as prescribed , got confused But gladly toleratign synthroid Old history Pt has been on cytomel 50 mcg bid She had labs done periodically and she had the cytomel and synthroid adjusted regularly She is c/o inability to gain weight She denies any other hyperthyroid symptoms She had cad , denies any anginal pain , sob Past Medical History:  
Diagnosis Date  CAD (coronary artery disease)  Diabetes ([facility]  Essential hypertension  Gastroesophageal reflux disease  Hyperlipidemia  Hypothyroid  Myocardial infarction ([facility]  Osteoarthritis  Peptic ulcer disease  Vitamin B12 deficiency Social History Socioeconomic History  Marital status:  Spouse name: Not on file  Number of children: Not on file  Years of education: Not on file  Highest education level: Not on file Social Needs  Financial resource strain: Not on file  Food insecurity - worry: Not on file  Food insecurity - inability: Not on file  Transportation needs - medical: Not on file  Transportation needs - non-medical: Not on file Occupational History  Not on file Tobacco Use  Smoking status: Current Every Day Smoker  Smokeless tobacco: Never Used Substance and Sexual Activity  Alcohol use: No  
 Drug use: No  
 Sexual activity: Not on file Other Topics Concern  Not on file Social History Narrative  Not on file Family History Problem Relation Age of Onset  Heart Attack Mother  Hypertension Mother  Diabetes Mother  Colon Cancer Father  Heart Surgery Father Review of Systems Constitutional: Positive for malaise/fatigue and weight loss. HENT: Negative. Eyes: Negative for pain and redness. Respiratory: Negative. Cardiovascular: Negative for chest pain, palpitations and leg swelling. Gastrointestinal: Negative. Negative for constipation. Genitourinary: Negative. Musculoskeletal: Negative for myalgias. Skin: Negative. Neurological: Negative. Endo/Heme/Allergies: Negative. Psychiatric/Behavioral: Negative for depression and memory loss. The patient does not have insomnia. Physical Exam  
Constitutional: She is oriented to person, place, and time. She appears well-developed and well-nourished. HENT:  
Head: Normocephalic. Eyes: Conjunctivae and EOM are normal. Pupils are equal, round, and reactive to light. Neck: Normal range of motion. Neck supple. No JVD present. No tracheal deviation present. No thyromegaly present. Cardiovascular: Normal rate, regular rhythm and normal heart sounds. No murmur heard. Pulmonary/Chest: Breath sounds normal.  
Abdominal: Soft. Bowel sounds are normal.  
Musculoskeletal: Normal range of motion. Lymphadenopathy:  
  She has no cervical adenopathy. Neurological: She is alert and oriented to person, place, and time. She has normal reflexes. Skin: Skin is warm. Psychiatric: She has a normal mood and affect. Lab Results Component Value Date/Time TSH 2.250 02/18/2019 01:26 PM  
 Triiodothyronine (T3), free 3.4 02/18/2019 01:26 PM  
 T4, Free 1.37 02/18/2019 01:26 PM  
  
 
 
ASSESSMENT and PLAN 1. Hypothyroidism : pt has long been on cytomel , she took  50 mcg twice a day until she came to see me in July 2018 She says her body is intolerant of synthroid She says she is not allergic to it but to be on safe side, I AM USING only plain 50 mcg pills HER TSH levels are very fluctuant -  
 Feb 2019 : TSH is 2.2 Dec 2018 : TSH is 21 Oct 11 2018 :TSH is 16 Sept 2018 :  TSH was 81 July 19 2018   TSh was 36.7 June 11 2018   TSH was 0.618 March 2018 was  11.27 Feb 2018 TSH was 0.262 TSH was 55 from dec 2017 After long conversation , she got convinced to try  Synthroid plain 50 mcg dose pill ( chose this only to avoid any potential dye allergies ) Calc dose by her body weight 75 mcg a day Synthroid 10 pills a week = 71 mcg a day plus  I dropped cytomel down to 5 mcg a day July 2018 visit , corrected her and I gave her pictorally the plan to take the meds Reassured her as transition is going to be [facility] Educated her on proper intake  
 
 sept 2018 - she got confused and has been using synthroid 50 mcg for 4 days and cytomel 5 for 3 days ( lower than given dose ) Oct 2018 : her TSH is down to 16  On the above dosage Still continuing on synthroid 50 mcg dose, but increased to an extra pill 4 days a week That means synthroid of 78 mcg plus cytomel of 5 mcg dose Dec 2018 :  TSH is 21 Increase synthroid to 50 mcg 2 pills daily  Along with  cytomel 5 mcg a day FEB 2019  : TSH   IS   2.2 SHE IS EUTHYROID - HURRAY AND SHE WILL CONTINUE ON SYNTHROID  50 MCG 2 PILLS A DAY  AND CYTOMEL 5 MCG A DAY SENILE OSTEOPOROSIS  : WILL DO DEXA DISCUSSED PROLIA  
 
 
> 50 % of time is spent on counseling Patient voiced understanding her plan of care

## 2019-03-19 NOTE — PROGRESS NOTES
1. Have you been to the ER, urgent care clinic since your last visit? Hospitalized since your last visit? No 
 
2. Have you seen or consulted any other health care providers outside of the [address] [place] [address] since your last visit? Include any pap smears or colon screening. No  
Chief Complaint Patient presents with  Thyroid Problem  
  followup Learning assessment complete Wt Readings from Last 3 Encounters:  
03/19/19 107 lb 9.6 oz (48.8 kg) 12/19/18 106 lb 3.2 oz (48.2 kg) 10/17/18 103 lb 3.2 oz (46.8 kg) Temp Readings from Last 3 Encounters:  
03/19/19 97.1 °F (36.2 °C) (Oral) 12/19/18 95.5 °F (35.3 °C) (Oral) 10/17/18 97.3 °F (36.3 °C) (Oral) BP Readings from Last 3 Encounters:  
03/19/19 157/64  
12/19/18 153/55  
10/17/18 169/64 Pulse Readings from Last 3 Encounters:  
03/19/19 64  
12/19/18 67  
10/17/18 86 Abuse Screening Questionnaire 3/19/2019 Do you ever feel afraid of your partner? Eliecer Seda Are you in a relationship with someone who physically or mentally threatens you? Eliecernelida Stack Is it safe for you to go home?  Maranda Henry

## 2019-03-19 NOTE — LETTER
3/20/19 Patient: Haleigh Lynne YOB: 1940 Date of Visit: 3/19/2019 Thomas Muhammad, [address] [facility] [id number] VIA Facsimile: [phone] Dear Thomas Muhammad MD, Thank you for referring Ms. Alla Johnson to [facility] for evaluation. My notes for this consultation are attached. If you have questions, please do not hesitate to call me. I look forward to following your patient along with you. Sincerely, So Garcia MD

## 2019-03-19 NOTE — PATIENT INSTRUCTIONS
Stay on   2 pills  50 mcg  Synthroid   Daily  ( haleigh short  ) Along with cytomel 5 mcg a day ( mail order ) 
 
on empty stomach with water only, no other meds or food or drinks   For next half hour Take any kind of vitamins, calcium, iron   Pills  4 hours later

## 2019-04-06 ENCOUNTER — DOCUMENTATION ONLY (OUTPATIENT)
Dept: ENDOCRINOLOGY | Age: 79
End: 2019-04-06

## 2019-04-07 NOTE — PROGRESS NOTES
Date : march 20 2019  Bone DEXA  ap spine T-score -3.9; left femoral Neck T- score  -3.7, right femoral neck T-score n/a       Inform pt that bone quality is very poor   Could benefit from daily shots like forteo     Can talk to her further about it if she is coming in sooner for vosit

## 2019-04-08 NOTE — PROGRESS NOTES
Spoke to patient. Informed her of bone quality and the benefits of taking Forteo. Patient verbalized understanding and also states she can not come in any sooner but will discuss Forteo at her July visit.

## 2019-04-11 DIAGNOSIS — M81.0 SENILE OSTEOPOROSIS: ICD-10-CM

## 2019-05-29 ENCOUNTER — HOSPITAL ENCOUNTER (OUTPATIENT)
Dept: LAB | Age: 79
Discharge: HOME | End: 2019-05-29
Payer: MEDICARE

## 2019-05-29 PROCEDURE — 36415 COLL VENOUS BLD VENIPUNCTURE: CPT

## 2019-05-29 PROCEDURE — 84439 ASSAY OF FREE THYROXINE: CPT

## 2019-05-29 PROCEDURE — 82306 VITAMIN D 25 HYDROXY: CPT

## 2019-05-29 PROCEDURE — 84443 ASSAY THYROID STIM HORMONE: CPT

## 2019-05-30 LAB
25(OH)D3+25(OH)D2 SERPL-MCNC: 23.1 NG/ML (ref 30–100)
T4 FREE SERPL-MCNC: 1.28 NG/DL (ref 0.82–1.77)
TSH SERPL DL<=0.005 MIU/L-ACNC: 3.64 UIU/ML (ref 0.45–4.5)

## 2019-07-02 ENCOUNTER — OFFICE VISIT (OUTPATIENT)
Dept: ENDOCRINOLOGY | Age: 79
End: 2019-07-02

## 2019-07-02 VITALS
OXYGEN SATURATION: 98 % | RESPIRATION RATE: 18 BRPM | DIASTOLIC BLOOD PRESSURE: 55 MMHG | TEMPERATURE: 97 F | HEIGHT: 62 IN | HEART RATE: 68 BPM | SYSTOLIC BLOOD PRESSURE: 144 MMHG | WEIGHT: 107.5 LBS | BODY MASS INDEX: 19.78 KG/M2

## 2019-07-02 DIAGNOSIS — E03.8 HYPOTHYROIDISM DUE TO HASHIMOTO'S THYROIDITIS: ICD-10-CM

## 2019-07-02 DIAGNOSIS — M81.0 SENILE OSTEOPOROSIS: ICD-10-CM

## 2019-07-02 DIAGNOSIS — E06.3 HYPOTHYROIDISM DUE TO HASHIMOTO'S THYROIDITIS: ICD-10-CM

## 2019-07-02 DIAGNOSIS — E06.3 HYPOTHYROIDISM DUE TO HASHIMOTO'S THYROIDITIS: Primary | ICD-10-CM

## 2019-07-02 DIAGNOSIS — E03.8 HYPOTHYROIDISM DUE TO HASHIMOTO'S THYROIDITIS: Primary | ICD-10-CM

## 2019-07-02 RX ORDER — LEVOTHYROXINE SODIUM 50 UG/1
100 TABLET ORAL
Qty: 180 TAB | Refills: 4 | Status: SHIPPED | OUTPATIENT
Start: 2019-07-02

## 2019-07-02 RX ORDER — LIOTHYRONINE SODIUM 5 UG/1
5 TABLET ORAL DAILY
Qty: 90 TAB | Refills: 4 | Status: SHIPPED | OUTPATIENT
Start: 2019-07-02

## 2019-07-02 NOTE — PROGRESS NOTES
HISTORY OF PRESENT ILLNESS  Zoë Schuster is a 70 y.o. female. HPI   f/u after last  visit for hypothyroidism management   From march 2019   Stable weight   COMPLIANT  WITH MEDS   SHE FEELS fine        Old history     She does  feel good   Has sleep issues still     Compliant with meds, c/o cost   She is being patient.         Old history :    She feels not good at all   She is not taking the meds as prescribed , got confused   But gladly toleratign synthroid       Old history     Pt has been on cytomel 50 mcg bid   She had labs done periodically and she had the cytomel and synthroid adjusted regularly     She is c/o inability to gain weight   She denies any other hyperthyroid symptoms     She had cad , denies any anginal pain , sob      Past Medical History:   Diagnosis Date    CAD (coronary artery disease)     Diabetes ([address]     Essential hypertension     Gastroesophageal reflux disease     Hyperlipidemia     Hypothyroid     Myocardial infarction ([address]     Osteoarthritis     Peptic ulcer disease     Vitamin B12 deficiency        Social History     Socioeconomic History    Marital status:      Spouse name: Not on file    Number of children: Not on file    Years of education: Not on file    Highest education level: Not on file   Occupational History    Not on file   Social Needs    Financial resource strain: Not on file    Food insecurity:     Worry: Not on file     Inability: Not on file    Transportation needs:     Medical: Not on file     Non-medical: Not on file   Tobacco Use    Smoking status: Current Every Day Smoker    Smokeless tobacco: Never Used   Substance and Sexual Activity    Alcohol use: No    Drug use: No    Sexual activity: Not on file   Lifestyle    Physical activity:     Days per week: Not on file     Minutes per session: Not on file    Stress: Not on file   Relationships    Social connections:     Talks on phone: Not on file     Gets together: Not on file Attends Yarsanism service: Not on file     Active member of club or organization: Not on file     Attends meetings of clubs or organizations: Not on file     Relationship status: Not on file    Intimate partner violence:     Fear of current or ex partner: Not on file     Emotionally abused: Not on file     Physically abused: Not on file     Forced sexual activity: Not on file   Other Topics Concern    Not on file   Social History Narrative    Not on file       Family History   Problem Relation Age of Onset    Heart Attack Mother     Hypertension Mother     Diabetes Mother     Colon Cancer Father     Heart Surgery Father        Review of Systems   Constitutional: Positive for malaise/fatigue and weight loss. HENT: Negative. Eyes: Negative for pain and redness. Respiratory: Negative. Cardiovascular: Negative for chest pain, palpitations and leg swelling. Gastrointestinal: Negative. Negative for constipation. Genitourinary: Negative. Musculoskeletal: Negative for myalgias. Skin: Negative. Neurological: Negative. Endo/Heme/Allergies: Negative. Psychiatric/Behavioral: Negative for depression and memory loss. The patient does not have insomnia. Physical Exam   Constitutional: She is oriented to person, place, and time. She appears well-developed and well-nourished. HENT:   Head: Normocephalic. Eyes: Conjunctivae and EOM are normal. Pupils are equal, round, and reactive to light. Neck: Normal range of motion. Neck supple. No JVD present. No tracheal deviation present. No thyromegaly present. Cardiovascular: Normal rate, regular rhythm and normal heart sounds. No murmur heard. Pulmonary/Chest: Breath sounds normal.   Abdominal: Soft. Bowel sounds are normal.   Musculoskeletal: Normal range of motion. Lymphadenopathy:     She has no cervical adenopathy. Neurological: She is alert and oriented to person, place, and time. She has normal reflexes. Skin: Skin is warm. Psychiatric: She has a normal mood and affect. Lab Results   Component Value Date/Time    TSH 3.640 05/29/2019 08:01 AM    Triiodothyronine (T3), free 3.4 02/18/2019 01:26 PM    T4, Free 1.28 05/29/2019 08:01 AM      May 2019  : TSH is 3.6    Feb 2019 : TSH is 2.2  Dec 2018 : TSH is 21   Oct 11 2018 :TSH is 16   Sept 2018 :  TSH was 81   July 19 2018   TSh was 36.7   June 11 2018   TSH was 0.618   March 2018 was  11.27   Feb 2018 TSH was 0.262  TSH was 55 from dec 2017     ASSESSMENT and PLAN    1.  Hypothyroidism : pt has long been on cytomel , she took  50 mcg twice a day until she came to see me in July 2018   She says her body is intolerant of synthroid   She says she is not allergic to it but to be on safe side, I AM USING only plain 50 mcg  SYNTHROID pills   After long conversation , she got convinced to try  Synthroid plain 50 mcg dose pill ( chose this only to avoid any potential dye allergies )  Calc dose by her body weight 75 mcg a day   Synthroid 10 pills a week = 71 mcg a day plus  I dropped cytomel down to 5 mcg a day      July 2018 visit , corrected her and I gave her pictorally the plan to take the meds   Reassured her as transition is going to be [facility] her on proper intake      sept 2018 - she got confused and has been using synthroid 50 mcg for 4 days and cytomel 5 for 3 days ( lower than given dose )      Oct 2018 : her TSH is down to 16  On the above dosage  Still continuing on synthroid 50 mcg dose, but increased to an extra pill 4 days a week    That means synthroid of 78 mcg plus cytomel of 5 mcg dose       Dec 2018 :  TSH is 21   Increase synthroid to 50 mcg 2 pills daily  Along with  cytomel 5 mcg a day       FEB 2019  : TSH   IS   2.2   SHE IS EUTHYROID - HURRAY AND SHE WILL CONTINUE ON SYNTHROID  50 MCG 2 PILLS A DAY  AND CYTOMEL 5 MCG A DAY         June 2019  : TSH   Is   3.6  SHE IS EUTHYROID - HURRAY again  AND SHE WILL CONTINUE ON SYNTHROID ( long ) 50 MCG 2 PILLS A DAY  AND  ( round ) CYTOMEL 5 MCG A DAY       SENILE OSTEOPOROSIS  :   Date : MArch 2019   Bone DEXA  ap spine T-score -3.9 ; left femoral Neck T- score  -3.7 , right femoral neck T-score n/a   Pt keeps mentioning that she is not for BONE MODULATION MEDS   SHE HAD MULTIPLE FALLS, AND HAS NOT BROKEN ANY BONE   THIS HAS MADE HER FEEL CONFIDENT     SHE IS SCARED TO TAKE ANY MEDS BECAUSE SHE READ ABOUT THE SIDE EFFECT OF THEM     WILL DO FORTEO 20 MG ONCE A DAY       > 50 % of time is spent on counseling   Patient voiced understanding her plan of care

## 2019-07-02 NOTE — LETTER
7/2/19 Patient: Mando King YOB: 1940 Date of Visit: 7/2/2019 Elisa Gleason, [address] [facility] [id number] VIA Facsimile: [phone] Dear Elisa Gleason MD, Thank you for referring Ms. Hazel Dangelo to [facility] for evaluation. My notes for this consultation are attached. If you have questions, please do not hesitate to call me. I look forward to following your patient along with you. Sincerely, Pelon Matson MD

## 2019-07-02 NOTE — PROGRESS NOTES
1. Have you been to the ER, urgent care clinic since your last visit? No Hospitalized since your last visit? No    2. Have you seen or consulted any other health care providers outside of the [address] [place] [address] since your last visit? Include any pap smears or colon screening.  No      Wt Readings from Last 3 Encounters:   07/02/19 107 lb 8 oz (48.8 kg)   03/19/19 107 lb 9.6 oz (48.8 kg)   12/19/18 106 lb 3.2 oz (48.2 kg)     Temp Readings from Last 3 Encounters:   07/02/19 97 °F (36.1 °C) (Oral)   03/19/19 97.1 °F (36.2 °C) (Oral)   12/19/18 95.5 °F (35.3 °C) (Oral)     BP Readings from Last 3 Encounters:   07/02/19 144/55   03/19/19 157/64   12/19/18 153/55     Pulse Readings from Last 3 Encounters:   07/02/19 68   03/19/19 64   12/19/18 67

## 2019-07-02 NOTE — PATIENT INSTRUCTIONS
Stay on   2 pills  50 mcg  Synthroid   Daily  ( patricia short  ) Along with cytomel 5 mcg a day ( mail order ) 
 
on empty stomach with water only, no other meds or food or drinks   For next half hour Take any kind of vitamins, calcium, iron   Pills  4 hours later 
 
------------------------------------------------------------------------------------------------- 
 
START ON FORTEO 20 MG  A DAY  
SEND  TO PATRICIA

## 2021-07-20 ENCOUNTER — TRANSCRIBE ORDER (OUTPATIENT)
Dept: SCHEDULING | Age: 81
End: 2021-07-20

## 2021-07-20 DIAGNOSIS — R26.89 LOSS OF BALANCE: ICD-10-CM

## 2021-07-20 DIAGNOSIS — R20.0 NUMBNESS IN BOTH LEGS: ICD-10-CM

## 2021-07-20 DIAGNOSIS — R51.9 HEADACHE: ICD-10-CM

## 2021-07-20 DIAGNOSIS — M54.50 LOW BACK PAIN: ICD-10-CM

## 2021-07-20 DIAGNOSIS — R42 DIZZINESS: Primary | ICD-10-CM

## 2021-07-27 ENCOUNTER — HOSPITAL ENCOUNTER (OUTPATIENT)
Dept: MRI IMAGING | Age: 81
Discharge: HOME | End: 2021-07-27
Attending: FAMILY MEDICINE
Payer: MEDICARE

## 2021-07-27 DIAGNOSIS — R20.0 NUMBNESS IN BOTH LEGS: ICD-10-CM

## 2021-07-27 DIAGNOSIS — M54.50 LOW BACK PAIN: ICD-10-CM

## 2021-07-27 DIAGNOSIS — R26.89 LOSS OF BALANCE: ICD-10-CM

## 2021-07-27 DIAGNOSIS — R51.9 HEADACHE: ICD-10-CM

## 2021-07-27 DIAGNOSIS — R42 DIZZINESS: ICD-10-CM

## 2021-07-27 PROCEDURE — 70551 MRI BRAIN STEM W/O DYE: CPT

## 2021-07-27 PROCEDURE — 72148 MRI LUMBAR SPINE W/O DYE: CPT

## 2021-11-12 ENCOUNTER — APPOINTMENT (OUTPATIENT)
Dept: GENERAL RADIOLOGY | Age: 81
DRG: 291 | End: 2021-11-12
Attending: NURSE PRACTITIONER
Payer: MEDICARE

## 2021-11-12 ENCOUNTER — HOSPITAL ENCOUNTER (INPATIENT)
Age: 81
LOS: 3 days | Discharge: HOME HEALTH | DRG: 291 | End: 2021-11-15
Attending: EMERGENCY MEDICINE | Admitting: FAMILY MEDICINE
Payer: MEDICARE

## 2021-11-12 DIAGNOSIS — J96.01 ACUTE RESPIRATORY FAILURE WITH HYPOXIA (HCC): Primary | ICD-10-CM

## 2021-11-12 DIAGNOSIS — E87.70 HYPERVOLEMIA, UNSPECIFIED HYPERVOLEMIA TYPE: ICD-10-CM

## 2021-11-12 PROBLEM — I11.0 HEART FAILURE DUE TO HIGH BLOOD PRESSURE (HCC): Status: ACTIVE | Noted: 2021-11-12

## 2021-11-12 LAB
ALBUMIN SERPL-MCNC: 3.2 G/DL (ref 3.5–5)
ALBUMIN/GLOB SERPL: 0.7 {RATIO} (ref 1.1–2.2)
ALP SERPL-CCNC: 132 U/L (ref 45–117)
ALT SERPL-CCNC: 29 U/L (ref 12–78)
ANION GAP SERPL CALC-SCNC: 8 MMOL/L (ref 5–15)
AST SERPL W P-5'-P-CCNC: 32 U/L (ref 15–37)
ATRIAL RATE: 71 BPM
BASOPHILS # BLD: 0.1 K/UL (ref 0–0.1)
BASOPHILS NFR BLD: 1 % (ref 0–1)
BILIRUB SERPL-MCNC: 0.6 MG/DL (ref 0.2–1)
BNP SERPL-MCNC: ABNORMAL PG/ML
BUN SERPL-MCNC: 22 MG/DL (ref 6–20)
BUN/CREAT SERPL: 14 (ref 12–20)
CA-I BLD-MCNC: 8.7 MG/DL (ref 8.5–10.1)
CALCULATED P AXIS, ECG09: 17 DEGREES
CALCULATED R AXIS, ECG10: 30 DEGREES
CALCULATED T AXIS, ECG11: 33 DEGREES
CHLORIDE SERPL-SCNC: 104 MMOL/L (ref 97–108)
CO2 SERPL-SCNC: 23 MMOL/L (ref 21–32)
CREAT SERPL-MCNC: 1.6 MG/DL (ref 0.55–1.02)
DIAGNOSIS, 93000: NORMAL
DIFFERENTIAL METHOD BLD: ABNORMAL
EOSINOPHIL # BLD: 0 K/UL (ref 0–0.4)
EOSINOPHIL NFR BLD: 0 % (ref 0–7)
ERYTHROCYTE [DISTWIDTH] IN BLOOD BY AUTOMATED COUNT: 16.4 % (ref 11.5–14.5)
GLOBULIN SER CALC-MCNC: 4.5 G/DL (ref 2–4)
GLUCOSE SERPL-MCNC: 110 MG/DL (ref 65–100)
HCT VFR BLD AUTO: 37.2 % (ref 35–47)
HGB BLD-MCNC: 11.7 G/DL (ref 11.5–16)
IMM GRANULOCYTES # BLD AUTO: 0 K/UL (ref 0–0.04)
IMM GRANULOCYTES NFR BLD AUTO: 0 % (ref 0–0.5)
LYMPHOCYTES # BLD: 1.1 K/UL (ref 0.8–3.5)
LYMPHOCYTES NFR BLD: 12 % (ref 12–49)
MCH RBC QN AUTO: 28.7 PG (ref 26–34)
MCHC RBC AUTO-ENTMCNC: 31.5 G/DL (ref 30–36.5)
MCV RBC AUTO: 91.2 FL (ref 80–99)
MONOCYTES # BLD: 0.9 K/UL (ref 0–1)
MONOCYTES NFR BLD: 9 % (ref 5–13)
NEUTS SEG # BLD: 7.5 K/UL (ref 1.8–8)
NEUTS SEG NFR BLD: 78 % (ref 32–75)
NRBC # BLD: 0 K/UL (ref 0–0.01)
NRBC BLD-RTO: 0 PER 100 WBC
P-R INTERVAL, ECG05: 82 MS
PLATELET # BLD AUTO: 231 K/UL (ref 150–400)
PMV BLD AUTO: 10.1 FL (ref 8.9–12.9)
POTASSIUM SERPL-SCNC: 5.3 MMOL/L (ref 3.5–5.1)
PROT SERPL-MCNC: 7.7 G/DL (ref 6.4–8.2)
Q-T INTERVAL, ECG07: 458 MS
QRS DURATION, ECG06: 110 MS
QTC CALCULATION (BEZET), ECG08: 497 MS
RBC # BLD AUTO: 4.08 M/UL (ref 3.8–5.2)
SODIUM SERPL-SCNC: 135 MMOL/L (ref 136–145)
TROPONIN-HIGH SENSITIVITY: 20 NG/L (ref 0–51)
VENTRICULAR RATE, ECG03: 71 BPM
WBC # BLD AUTO: 9.6 K/UL (ref 3.6–11)

## 2021-11-12 PROCEDURE — 80053 COMPREHEN METABOLIC PANEL: CPT

## 2021-11-12 PROCEDURE — 65270000029 HC RM PRIVATE

## 2021-11-12 PROCEDURE — 74011250636 HC RX REV CODE- 250/636: Performed by: EMERGENCY MEDICINE

## 2021-11-12 PROCEDURE — 99284 EMERGENCY DEPT VISIT MOD MDM: CPT

## 2021-11-12 PROCEDURE — 94640 AIRWAY INHALATION TREATMENT: CPT

## 2021-11-12 PROCEDURE — 85025 COMPLETE CBC W/AUTO DIFF WBC: CPT

## 2021-11-12 PROCEDURE — 36415 COLL VENOUS BLD VENIPUNCTURE: CPT

## 2021-11-12 PROCEDURE — 93005 ELECTROCARDIOGRAM TRACING: CPT

## 2021-11-12 PROCEDURE — 71045 X-RAY EXAM CHEST 1 VIEW: CPT

## 2021-11-12 PROCEDURE — 74011250636 HC RX REV CODE- 250/636: Performed by: FAMILY MEDICINE

## 2021-11-12 PROCEDURE — 96372 THER/PROPH/DIAG INJ SC/IM: CPT

## 2021-11-12 PROCEDURE — 74011250637 HC RX REV CODE- 250/637: Performed by: FAMILY MEDICINE

## 2021-11-12 PROCEDURE — 84484 ASSAY OF TROPONIN QUANT: CPT

## 2021-11-12 PROCEDURE — 74011000250 HC RX REV CODE- 250: Performed by: FAMILY MEDICINE

## 2021-11-12 PROCEDURE — 83880 ASSAY OF NATRIURETIC PEPTIDE: CPT

## 2021-11-12 PROCEDURE — 96374 THER/PROPH/DIAG INJ IV PUSH: CPT

## 2021-11-12 RX ORDER — IPRATROPIUM BROMIDE AND ALBUTEROL SULFATE 2.5; .5 MG/3ML; MG/3ML
3 SOLUTION RESPIRATORY (INHALATION)
Status: DISCONTINUED | OUTPATIENT
Start: 2021-11-12 | End: 2021-11-12

## 2021-11-12 RX ORDER — IPRATROPIUM BROMIDE AND ALBUTEROL SULFATE 2.5; .5 MG/3ML; MG/3ML
3 SOLUTION RESPIRATORY (INHALATION)
Status: DISCONTINUED | OUTPATIENT
Start: 2021-11-13 | End: 2021-11-12

## 2021-11-12 RX ORDER — HYDRALAZINE HYDROCHLORIDE 20 MG/ML
20 INJECTION INTRAMUSCULAR; INTRAVENOUS EVERY 6 HOURS
Status: DISCONTINUED | OUTPATIENT
Start: 2021-11-12 | End: 2021-11-14

## 2021-11-12 RX ORDER — LEVOFLOXACIN 5 MG/ML
250 INJECTION, SOLUTION INTRAVENOUS
Status: DISCONTINUED | OUTPATIENT
Start: 2021-11-14 | End: 2021-11-14

## 2021-11-12 RX ORDER — HYDRALAZINE HYDROCHLORIDE 25 MG/1
25 TABLET, FILM COATED ORAL 3 TIMES DAILY
Status: DISCONTINUED | OUTPATIENT
Start: 2021-11-12 | End: 2021-11-13

## 2021-11-12 RX ORDER — FUROSEMIDE 10 MG/ML
40 INJECTION INTRAMUSCULAR; INTRAVENOUS EVERY 12 HOURS
Status: DISCONTINUED | OUTPATIENT
Start: 2021-11-12 | End: 2021-11-13

## 2021-11-12 RX ORDER — FUROSEMIDE 10 MG/ML
60 INJECTION INTRAMUSCULAR; INTRAVENOUS
Status: COMPLETED | OUTPATIENT
Start: 2021-11-12 | End: 2021-11-12

## 2021-11-12 RX ORDER — IPRATROPIUM BROMIDE AND ALBUTEROL SULFATE 2.5; .5 MG/3ML; MG/3ML
3 SOLUTION RESPIRATORY (INHALATION)
Status: DISCONTINUED | OUTPATIENT
Start: 2021-11-12 | End: 2021-11-13

## 2021-11-12 RX ORDER — METOPROLOL TARTRATE 50 MG/1
50 TABLET ORAL DAILY
Status: DISCONTINUED | OUTPATIENT
Start: 2021-11-12 | End: 2021-11-15

## 2021-11-12 RX ORDER — LEVOFLOXACIN 5 MG/ML
500 INJECTION, SOLUTION INTRAVENOUS
Status: COMPLETED | OUTPATIENT
Start: 2021-11-12 | End: 2021-11-12

## 2021-11-12 RX ORDER — HEPARIN SODIUM 5000 [USP'U]/ML
5000 INJECTION, SOLUTION INTRAVENOUS; SUBCUTANEOUS EVERY 12 HOURS
Status: DISCONTINUED | OUTPATIENT
Start: 2021-11-12 | End: 2021-11-15 | Stop reason: HOSPADM

## 2021-11-12 RX ADMIN — FUROSEMIDE 60 MG: 10 INJECTION INTRAMUSCULAR; INTRAVENOUS at 16:26

## 2021-11-12 RX ADMIN — IPRATROPIUM BROMIDE AND ALBUTEROL SULFATE 3 ML: .5; 3 SOLUTION RESPIRATORY (INHALATION) at 19:42

## 2021-11-12 RX ADMIN — HYDRALAZINE HYDROCHLORIDE 25 MG: 25 TABLET, FILM COATED ORAL at 20:22

## 2021-11-12 RX ADMIN — METOPROLOL TARTRATE 50 MG: 50 TABLET, FILM COATED ORAL at 16:26

## 2021-11-12 RX ADMIN — IPRATROPIUM BROMIDE AND ALBUTEROL SULFATE 3 ML: .5; 2.5 SOLUTION RESPIRATORY (INHALATION) at 23:27

## 2021-11-12 RX ADMIN — FUROSEMIDE 40 MG: 10 INJECTION, SOLUTION INTRAVENOUS at 20:23

## 2021-11-12 RX ADMIN — HEPARIN SODIUM 5000 UNITS: 5000 INJECTION INTRAVENOUS; SUBCUTANEOUS at 16:26

## 2021-11-12 RX ADMIN — FAMOTIDINE 20 MG: 10 INJECTION, SOLUTION INTRAVENOUS at 20:23

## 2021-11-12 RX ADMIN — METHYLPREDNISOLONE SODIUM SUCCINATE 40 MG: 40 INJECTION, POWDER, FOR SOLUTION INTRAMUSCULAR; INTRAVENOUS at 20:26

## 2021-11-12 RX ADMIN — LEVOFLOXACIN 500 MG: 500 INJECTION, SOLUTION INTRAVENOUS at 20:25

## 2021-11-12 RX ADMIN — HYDRALAZINE HYDROCHLORIDE 25 MG: 25 TABLET, FILM COATED ORAL at 16:26

## 2021-11-12 NOTE — ED PROVIDER NOTES
EMERGENCY DEPARTMENT HISTORY AND PHYSICAL EXAM      Date: 11/12/2021  Patient Name: Selina Wallis      History of Presenting Illness     Chief Complaint   Patient presents with    Shortness of Breath       History Provided By: Patient and Caregiver    HPI: Selina Wallis, 80 y.o. female with a past medical history significant diabetes, hypertension, hyperlipidemia, myocardial infarction and CHF presents to the ED with cc of shortness of breath. Patient reports increasing shortness of breath over the last few days. Patient was seen by her primary care and sent for additional work-up with concern for CHF exacerbation. Patient reports orthopnea, unable to lie flat due to shortness of breath. Patient denies fevers or chills, denies nausea or vomiting. Patient denies chest pain. There are no other complaints, changes, or physical findings at this time. PCP: Marya Robbins MD    Current Facility-Administered Medications   Medication Dose Route Frequency Provider Last Rate Last Admin    furosemide (LASIX) injection 60 mg  60 mg IntraVENous NOW Marietta Perez MD         Current Outpatient Medications   Medication Sig Dispense Refill    levothyroxine (SYNTHROID) 50 mcg tablet Take 2 Tabs by mouth Daily (before breakfast). 180 Tab 4    liothyronine (CYTOMEL) 5 mcg tablet Take 1 Tab by mouth daily. 90 Tab 4    teriparatide (FORTEO) 20 mcg/dose - 600 mcg/2.4 mL pnij injection 0.08 mL by SubCUTAneous route daily. 2.4 mL 6    losartan (COZAAR) 50 mg tablet Take  by mouth daily.  bisoprolol (ZEBETA) 5 mg tablet Take 5 mg by mouth daily.  SITagliptin (JANUVIA) 100 mg tablet Take 100 mg by mouth daily.  Ramipril 2.5 mg Tab Take 2.5 mg by mouth daily. 30 Tab 11    clopidogrel (PLAVIX) 75 mg tablet Take  by mouth daily.  CYANOCOBALAMIN, VITAMIN B-12, (VITAMIN B-12 PO) Take  by mouth.          Past History     Past Medical History:  Past Medical History:   Diagnosis Date    CAD (coronary artery disease)     [facility]     Essential hypertension     Gastroesophageal reflux disease     Hyperlipidemia     Hypothyroid     Myocardial infarction ([facility]     Osteoarthritis     Peptic ulcer disease     Vitamin B12 deficiency        Past Surgical History:  Past Surgical History:   Procedure Laterality Date    HX ANGIOPLASTY      HX BACK SURGERY      HX CORONARY STENT PLACEMENT      HX HEART CATHETERIZATION      HX HYSTERECTOMY      HX KNEE ARTHROSCOPY      HX SHOULDER ARTHROSCOPY         Family History:  Family History   Problem Relation Age of Onset    Heart Attack Mother     Hypertension Mother     Diabetes Mother     Colon Cancer Father     Heart Surgery Father        Social History:  Social History     Tobacco Use    Smoking status: Current Every Day Smoker    Smokeless tobacco: Never Used   Substance Use Topics    Alcohol use: No    Drug use: No       Allergies: Allergies   Allergen Reactions    Aspirin Other (comments)    Codeine Other (comments)    Morphine Other (comments)    Pcn [Penicillins] Other (comments)         Review of Systems   Review of Systems   Constitutional: Negative for chills and fever. HENT: Negative for sinus pressure and sinus pain. Eyes: Negative for photophobia and redness. Respiratory: Positive for shortness of breath and wheezing. Cardiovascular: Negative for chest pain and palpitations. Gastrointestinal: Negative for abdominal pain and nausea. Genitourinary: Negative for flank pain and hematuria. Musculoskeletal: Negative for arthralgias and gait problem. Skin: Negative for color change and pallor. Neurological: Negative for dizziness, positive for generalized weakness. Review of Systems    Physical Exam   Physical Exam  Constitutional:       General: No acute distress. Appearance: Normal appearance. Not toxic-appearing. HENT:      Head: Normocephalic and atraumatic.       Nose: Nose normal.      Mouth/Throat: Mouth: Mucous membranes are moist.   Eyes:      Extraocular Movements: Extraocular movements intact. Pupils: Pupils are equal, round, and reactive to light. Cardiovascular:      Rate and Rhythm: Normal rate. Pulses: Normal pulses. Pulmonary: Moderate respiratory distress with tachypnea, increased work of breathing   Abdominal:      General: Abdomen is flat. There is no distension. Musculoskeletal:         General: Normal range of motion. Cervical back: Normal range of motion and neck supple. Skin:     General: Skin is warm and dry. Capillary Refill: Capillary refill takes less than 2 seconds. Neurological:      General: No focal deficit present. Mental Status: Alert and oriented to person, place, and time.    Psychiatric:         Mood and Affect: Mood normal.         Behavior: Behavior normal.       Physical Exam    Lab and Diagnostic Study Results     Labs -     Recent Results (from the past 12 hour(s))   EKG, 12 LEAD, INITIAL    Collection Time: 11/12/21  1:34 PM   Result Value Ref Range    Ventricular Rate 71 BPM    Atrial Rate 71 BPM    P-R Interval 82 ms    QRS Duration 110 ms    Q-T Interval 458 ms    QTC Calculation (Bezet) 497 ms    Calculated P Axis 17 degrees    Calculated R Axis 30 degrees    Calculated T Axis 33 degrees    Diagnosis       Sinus rhythm with short NJ  Incomplete right bundle branch block  Nonspecific ST and T wave abnormality  Abnormal ECG  When compared with ECG of 30-AUG-2010 08:14,  Incomplete right bundle branch block is now Present  Borderline criteria for Inferior infarct are no longer Present  Confirmed by Brenda Rankin ([id number]) on 11/12/2021 1:49:32 PM     CBC WITH AUTOMATED DIFF    Collection Time: 11/12/21  2:10 PM   Result Value Ref Range    WBC 9.6 3.6 - 11.0 K/uL    RBC 4.08 3.80 - 5.20 M/uL    HGB 11.7 11.5 - 16.0 g/dL    HCT 37.2 35.0 - 47.0 %    MCV 91.2 80.0 - 99.0 FL    MCH 28.7 26.0 - 34.0 PG    MCHC 31.5 30.0 - 36.5 g/dL    RDW 16.4 (H) 11.5 - 14.5 %    PLATELET 771 902 - 010 K/uL    MPV 10.1 8.9 - 12.9 FL    NRBC 0.0 0.0  WBC    ABSOLUTE NRBC 0.00 0.00 - 0.01 K/uL    NEUTROPHILS 78 (H) 32 - 75 %    LYMPHOCYTES 12 12 - 49 %    MONOCYTES 9 5 - 13 %    EOSINOPHILS 0 0 - 7 %    BASOPHILS 1 0 - 1 %    IMMATURE GRANULOCYTES 0 0 - 0.5 %    ABS. NEUTROPHILS 7.5 1.8 - 8.0 K/UL    ABS. LYMPHOCYTES 1.1 0.8 - 3.5 K/UL    ABS. MONOCYTES 0.9 0.0 - 1.0 K/UL    ABS. EOSINOPHILS 0.0 0.0 - 0.4 K/UL    ABS. BASOPHILS 0.1 0.0 - 0.1 K/UL    ABS. IMM. GRANS. 0.0 0.00 - 0.04 K/UL    DF AUTOMATED     METABOLIC PANEL, COMPREHENSIVE    Collection Time: 11/12/21  2:10 PM   Result Value Ref Range    Sodium 135 (L) 136 - 145 mmol/L    Potassium 5.3 (H) 3.5 - 5.1 mmol/L    Chloride 104 97 - 108 mmol/L    CO2 23 21 - 32 mmol/L    Anion gap 8 5 - 15 mmol/L    Glucose 110 (H) 65 - 100 mg/dL    BUN 22 (H) 6 - 20 mg/dL    Creatinine 1.60 (H) 0.55 - 1.02 mg/dL    BUN/Creatinine ratio 14 12 - 20      GFR est AA 38 (L) >60 ml/min/1.73m2    GFR est non-AA 31 (L) >60 ml/min/1.73m2    Calcium 8.7 8.5 - 10.1 mg/dL    Bilirubin, total 0.6 0.2 - 1.0 mg/dL    AST (SGOT) 32 15 - 37 U/L    ALT (SGPT) 29 12 - 78 U/L    Alk. phosphatase 132 (H) 45 - 117 U/L    Protein, total 7.7 6.4 - 8.2 g/dL    Albumin 3.2 (L) 3.5 - 5.0 g/dL    Globulin 4.5 (H) 2.0 - 4.0 g/dL    A-G Ratio 0.7 (L) 1.1 - 2.2     TROPONIN-HIGH SENSITIVITY    Collection Time: 11/12/21  2:10 PM   Result Value Ref Range    Troponin-High Sensitivity 20 0 - 51 ng/L   NT-PRO BNP    Collection Time: 11/12/21  2:10 PM   Result Value Ref Range    NT pro-BNP 10,039 (H) <450 pg/mL       Radiologic Studies -   [unfilled]  CT Results  (Last 48 hours)    None        CXR Results  (Last 48 hours)               11/12/21 1426  XR CHEST SNGL V Final result    Narrative:  Chest single view. Comparison single view chest August 29, 2010. Hazy reticular markings central and basilar lungs.  Consider mild degree   interstitial edema superimposed upon chronic change. Sternal wires in the   midline related to prior intrathoracic surgery. Cardiac and mediastinal   structures unchanged noting thoracic aorta atherosclerosis. No pneumothorax. +/-   Small dependent pleural effusions. Medical Decision Making and ED Course   - I am the first and primary provider for this patient AND AM THE PRIMARY PROVIDER OF RECORD. - I reviewed the vital signs, available nursing notes, past medical history, past surgical history, family history and social history. - Initial assessment performed. The patients presenting problems have been discussed, and the staff are in agreement with the care plan formulated and outlined with them. I have encouraged them to ask questions as they arise throughout their visit. Vital Signs-Reviewed the patient's vital signs. Patient Vitals for the past 12 hrs:   Temp Pulse Resp BP SpO2   11/12/21 1420     94 %   11/12/21 1400     (!) 87 %   11/12/21 1332    (!) 192/71    11/12/21 1326 97.5 °F (36.4 °C) 71 16  93 %       ED Course:       ED Course as of 11/12/21 1547   Fri Nov 12, 2021   1540 Discussed case with patient and family present regarding appearance of fluid overload. Will admit for continued treatment and monitoring. Patient and family understanding and in agreement of the plan. [CS]      ED Course User Index  [CS] Reny Linda MD         Provider Notes (Medical Decision Making):   Patient with orthopnea, hypoxic at 86% on room air during my evaluation. Will place on 2 L and get diagnostic work-up. Discussed plan for admission with patient and family present. MDM           Consultations:       Consultations: -  Hospitalist Consultant: Dr. Mayco Liu: We have asked for emergent assistance with regard to this patient. We have discussed the patients HPI, ROS, PE and results this far. They will come and evaluate the patient for admission.         Procedures and Critical Care CRITICAL CARE NOTE :  2:28 PM  Amount of Critical Care Time: 35(minutes)    IMPENDING DETERIORATION -Airway and Respiratory  ASSOCIATED RISK FACTORS - Hypoxia  MANAGEMENT- Bedside Assessment and Supervision of Care  INTERPRETATION -  Xrays and ECG  INTERVENTIONS -placed on oxygen  CASE REVIEW - Hospitalist/Intensivist, Nursing and Family  TREATMENT RESPONSE -Improved  PERFORMED BY - Self    NOTES   :  I have spent critical care time involved in lab review, consultations with specialist, family decision- making, bedside attention and documentation. This time excludes time spent in any separate billed procedures. During this entire length of time I was immediately available to the patient . Sarah Munoz MD        Disposition     Disposition: Admitted to Floor Medical Floor the case was discussed with the admitting physician Angie Herman    Admitted        Diagnosis     Clinical Impression:   1. Acute respiratory failure with hypoxia ([address]    2. Hypervolemia, unspecified hypervolemia type        Attestations:    Sarah Munoz MD    Please note that this dictation was completed with [employer], the computer voice recognition software. Quite often unanticipated grammatical, syntax, homophones, and other interpretive errors are inadvertently transcribed by the computer software. Please disregard these errors. Please excuse any errors that have escaped final proofreading. Thank you.

## 2021-11-12 NOTE — ROUTINE PROCESS
TRANSFER - OUT REPORT:    Verbal report given to Jordy RN (name) on Wava Ran  being transferred to 466(unit) for routine progression of care       Report consisted of patients Situation, Background, Assessment and   Recommendations(SBAR). Information from the following report(s) SBAR and ED Summary was reviewed with the receiving nurse. Lines:   Peripheral IV 11/12/21 Posterior; Right Hand (Active)        Opportunity for questions and clarification was provided.       Patient transported with:   [employer]

## 2021-11-12 NOTE — H&P
History & Physical    Primary Care Provider: Cornelius Dill MD  Source of Information: Patient     History of Presenting Illness:   Yahaira Cui is a 80 y.o. female who presents with worsening shortness of breath. Patient reports increasing shortness of breath over the last few days. Patient reports orthopnea, unable to lie flat due to shortness of breath, PCP instructed patient to come to emergency room, patient lives alone. Patient continue smoking cigarettes,  patient denies fevers or chills, denies nausea or vomiting. She also complains wheezing, chest x-ray showed pulmonary edema, BNP over 10 K, I will admit the patient to 4 W.for  further management     Review of Systems:  A comprehensive review of systems was negative except for that written in the History of Present Illness. Past Medical History:   Diagnosis Date    CAD (coronary artery disease)     [facility]     Essential hypertension     Gastroesophageal reflux disease     Hyperlipidemia     Hypothyroid     Myocardial infarction ([facility]     Osteoarthritis     Peptic ulcer disease     Vitamin B12 deficiency       Past Surgical History:   Procedure Laterality Date    HX ANGIOPLASTY      HX BACK SURGERY      HX CORONARY STENT PLACEMENT      HX HEART CATHETERIZATION      HX HYSTERECTOMY      HX KNEE ARTHROSCOPY      HX SHOULDER ARTHROSCOPY       Prior to Admission medications    Medication Sig Start Date End Date Taking? Authorizing Provider   levothyroxine (SYNTHROID) 50 mcg tablet Take 2 Tabs by mouth Daily (before breakfast). 7/2/19   Monae Ramsay MD   liothyronine (CYTOMEL) 5 mcg tablet Take 1 Tab by mouth daily. 7/2/19   Monae Ramsay MD   teriparatide (FORTEO) 20 mcg/dose - 600 mcg/2.4 mL pnij injection 0.08 mL by SubCUTAneous route daily. 7/2/19   Monae Ramsay MD   losartan (COZAAR) 50 mg tablet Take  by mouth daily.     Provider, Historical   bisoprolol (ZEBETA) 5 mg tablet Take 5 mg by mouth daily. Provider, Historical   SITagliptin (JANUVIA) 100 mg tablet Take 100 mg by mouth daily. Provider, Historical   Ramipril 2.5 mg Tab Take 2.5 mg by mouth daily. 11/11/10   Loyda Ayers NP   clopidogrel (PLAVIX) 75 mg tablet Take  by mouth daily. Provider, Historical   CYANOCOBALAMIN, VITAMIN B-12, (VITAMIN B-12 PO) Take  by mouth. Provider, Historical     Allergies   Allergen Reactions    Aspirin Other (comments)    Codeine Other (comments)    Morphine Other (comments)    Pcn [Penicillins] Other (comments)      Family History   Problem Relation Age of Onset    Heart Attack Mother     Hypertension Mother     Diabetes Mother     Colon Cancer Father     Heart Surgery Father         SOCIAL HISTORY:  Patient resides:  Independently X   Assisted Living    SNF    With family care       Smoking history:   None    Former    Chronic x     Alcohol history:   None x   Social    Chronic      Ambulates:   Independently x   w/cane    w/walker    w/wc    CODE STATUS:  DNR x   Full    Other      Objective:     Physical Exam:     Visit Vitals  BP (!) 192/71   Pulse 71   Temp 97.5 °F (36.4 °C)   Resp 16   Ht 5' 2\" (1.575 m)   Wt 47.6 kg (105 lb)   SpO2 94%   BMI 19.20 kg/m²    O2 Flow Rate (L/min): 4 l/min O2 Device: Nasal cannula    General:  Alert, cooperative, no distress, appears stated age. Head:  Normocephalic, without obvious abnormality, atraumatic. Eyes:  Conjunctivae/corneas clear. PERRL, EOMs intact. Nose: Nares normal. Septum midline. Mucosa normal. No drainage or sinus tenderness. Throat: Lips, mucosa, and tongue normal. Teeth and gums normal.   Neck: Supple, symmetrical, trachea midline, no adenopathy, thyroid: no enlargement/tenderness/nodules, no carotid bruit and no JVD. Back:   Symmetric, no curvature. ROM normal. No CVA tenderness. Lungs:    Decreased breathing sound bilateral with wheezing rhonchi   Chest wall:  No tenderness or deformity.    Heart: Regular rate and rhythm, S1, S2 normal, no murmur, click, rub or gallop. Abdomen:   Soft, non-tender. Bowel sounds normal. No masses,  No organomegaly. Extremities: Extremities normal, atraumatic, no cyanosis or edema. Pulses: 2+ and symmetric all extremities. Skin: Skin color, texture, turgor normal. No rashes or lesions   Neurologic: CNII-XII intact. No motor or sensory deficits. EKG:  normal EKG, normal sinus rhythm, unchanged from previous tracings. Data Review:     Recent Days:  Recent Labs     11/12/21  1410   WBC 9.6   HGB 11.7   HCT 37.2        Recent Labs     11/12/21  1410   *   K 5.3*      CO2 23   *   BUN 22*   CREA 1.60*   CA 8.7   ALB 3.2*   TBILI 0.6   ALT 29     No results for input(s): PH, PCO2, PO2, HCO3, FIO2 in the last 72 hours.     24 Hour Results:  Recent Results (from the past 24 hour(s))   EKG, 12 LEAD, INITIAL    Collection Time: 11/12/21  1:34 PM   Result Value Ref Range    Ventricular Rate 71 BPM    Atrial Rate 71 BPM    P-R Interval 82 ms    QRS Duration 110 ms    Q-T Interval 458 ms    QTC Calculation (Bezet) 497 ms    Calculated P Axis 17 degrees    Calculated R Axis 30 degrees    Calculated T Axis 33 degrees    Diagnosis       Sinus rhythm with short PA  Incomplete right bundle branch block  Nonspecific ST and T wave abnormality  Abnormal ECG  When compared with ECG of 30-AUG-2010 08:14,  Incomplete right bundle branch block is now Present  Borderline criteria for Inferior infarct are no longer Present  Confirmed by OBI Maurer ([id number]) on 11/12/2021 1:49:32 PM     CBC WITH AUTOMATED DIFF    Collection Time: 11/12/21  2:10 PM   Result Value Ref Range    WBC 9.6 3.6 - 11.0 K/uL    RBC 4.08 3.80 - 5.20 M/uL    HGB 11.7 11.5 - 16.0 g/dL    HCT 37.2 35.0 - 47.0 %    MCV 91.2 80.0 - 99.0 FL    MCH 28.7 26.0 - 34.0 PG    MCHC 31.5 30.0 - 36.5 g/dL    RDW 16.4 (H) 11.5 - 14.5 %    PLATELET 714 437 - 760 K/uL    MPV 10.1 8.9 - 12.9 FL    NRBC 0.0 0. 0  WBC    ABSOLUTE NRBC 0.00 0.00 - 0.01 K/uL    NEUTROPHILS 78 (H) 32 - 75 %    LYMPHOCYTES 12 12 - 49 %    MONOCYTES 9 5 - 13 %    EOSINOPHILS 0 0 - 7 %    BASOPHILS 1 0 - 1 %    IMMATURE GRANULOCYTES 0 0 - 0.5 %    ABS. NEUTROPHILS 7.5 1.8 - 8.0 K/UL    ABS. LYMPHOCYTES 1.1 0.8 - 3.5 K/UL    ABS. MONOCYTES 0.9 0.0 - 1.0 K/UL    ABS. EOSINOPHILS 0.0 0.0 - 0.4 K/UL    ABS. BASOPHILS 0.1 0.0 - 0.1 K/UL    ABS. IMM. GRANS. 0.0 0.00 - 0.04 K/UL    DF AUTOMATED     METABOLIC PANEL, COMPREHENSIVE    Collection Time: 11/12/21  2:10 PM   Result Value Ref Range    Sodium 135 (L) 136 - 145 mmol/L    Potassium 5.3 (H) 3.5 - 5.1 mmol/L    Chloride 104 97 - 108 mmol/L    CO2 23 21 - 32 mmol/L    Anion gap 8 5 - 15 mmol/L    Glucose 110 (H) 65 - 100 mg/dL    BUN 22 (H) 6 - 20 mg/dL    Creatinine 1.60 (H) 0.55 - 1.02 mg/dL    BUN/Creatinine ratio 14 12 - 20      GFR est AA 38 (L) >60 ml/min/1.73m2    GFR est non-AA 31 (L) >60 ml/min/1.73m2    Calcium 8.7 8.5 - 10.1 mg/dL    Bilirubin, total 0.6 0.2 - 1.0 mg/dL    AST (SGOT) 32 15 - 37 U/L    ALT (SGPT) 29 12 - 78 U/L    Alk. phosphatase 132 (H) 45 - 117 U/L    Protein, total 7.7 6.4 - 8.2 g/dL    Albumin 3.2 (L) 3.5 - 5.0 g/dL    Globulin 4.5 (H) 2.0 - 4.0 g/dL    A-G Ratio 0.7 (L) 1.1 - 2.2     TROPONIN-HIGH SENSITIVITY    Collection Time: 11/12/21  2:10 PM   Result Value Ref Range    Troponin-High Sensitivity 20 0 - 51 ng/L   NT-PRO BNP    Collection Time: 11/12/21  2:10 PM   Result Value Ref Range    NT pro-BNP 10,039 (H) <450 pg/mL         Imaging:   Study Result    Narrative & Impression   Chest single view.     Comparison single view chest August 29, 2010.     Hazy reticular markings central and basilar lungs. Consider mild degree  interstitial edema superimposed upon chronic change. Sternal wires in the  midline related to prior intrathoracic surgery. Cardiac and mediastinal  structures unchanged noting thoracic aorta atherosclerosis. No pneumothorax.  +/-  Small dependent pleural effusions. Assessment:     1.congestive heart failure  2.malignant hypertension  3.chronic kidney disease stage III  4.  COPD exacerbation, cannot rule out pneumonia  Plan:    Will admit patient to 3 W., start patient with O2 protocol as tolerated, IV Lasix 40 IV twice a day, beta-blocker 50 mg p.o. daily due to renal function no ARBs and ACEI, start  hydralazine 25 3 times a day, IV hydralazine as needed, her troponin level is normal  Empirically cover patient with IV Levaquin,breathing treatment, IV Solu-Medrol  Check 2D echo  Check intake output  DVT stress ulcer prophylaxis  Cardiology consult as needed  Discussed with patient,  per patient she is making her own decision and they she wants DNR    Signed By: Anabela Taveras MD     November 12, 2021

## 2021-11-13 LAB
ANION GAP SERPL CALC-SCNC: 5 MMOL/L (ref 5–15)
BNP SERPL-MCNC: ABNORMAL PG/ML
BUN SERPL-MCNC: 24 MG/DL (ref 6–20)
BUN/CREAT SERPL: 15 (ref 12–20)
CA-I BLD-MCNC: 8.7 MG/DL (ref 8.5–10.1)
CHLORIDE SERPL-SCNC: 104 MMOL/L (ref 97–108)
CO2 SERPL-SCNC: 26 MMOL/L (ref 21–32)
COVID-19 RAPID TEST, COVR: NOT DETECTED
CREAT SERPL-MCNC: 1.62 MG/DL (ref 0.55–1.02)
GLUCOSE BLD STRIP.AUTO-MCNC: 178 MG/DL (ref 65–117)
GLUCOSE BLD STRIP.AUTO-MCNC: 182 MG/DL (ref 65–117)
GLUCOSE BLD STRIP.AUTO-MCNC: 200 MG/DL (ref 65–117)
GLUCOSE BLD STRIP.AUTO-MCNC: 262 MG/DL (ref 65–117)
GLUCOSE SERPL-MCNC: 156 MG/DL (ref 65–100)
PERFORMED BY, TECHID: ABNORMAL
POTASSIUM SERPL-SCNC: 4.4 MMOL/L (ref 3.5–5.1)
SARS-COV-2, COV2: NORMAL
SODIUM SERPL-SCNC: 135 MMOL/L (ref 136–145)
SPECIMEN SOURCE: NORMAL
TSH SERPL DL<=0.05 MIU/L-ACNC: 3.75 UIU/ML (ref 0.36–3.74)

## 2021-11-13 PROCEDURE — 74011636637 HC RX REV CODE- 636/637: Performed by: FAMILY MEDICINE

## 2021-11-13 PROCEDURE — 74011000250 HC RX REV CODE- 250: Performed by: FAMILY MEDICINE

## 2021-11-13 PROCEDURE — 80048 BASIC METABOLIC PNL TOTAL CA: CPT

## 2021-11-13 PROCEDURE — 94760 N-INVAS EAR/PLS OXIMETRY 1: CPT

## 2021-11-13 PROCEDURE — 82962 GLUCOSE BLOOD TEST: CPT

## 2021-11-13 PROCEDURE — 74011250636 HC RX REV CODE- 250/636: Performed by: FAMILY MEDICINE

## 2021-11-13 PROCEDURE — 84443 ASSAY THYROID STIM HORMONE: CPT

## 2021-11-13 PROCEDURE — 94640 AIRWAY INHALATION TREATMENT: CPT

## 2021-11-13 PROCEDURE — 87635 SARS-COV-2 COVID-19 AMP PRB: CPT

## 2021-11-13 PROCEDURE — 74011250637 HC RX REV CODE- 250/637: Performed by: FAMILY MEDICINE

## 2021-11-13 PROCEDURE — 83880 ASSAY OF NATRIURETIC PEPTIDE: CPT

## 2021-11-13 PROCEDURE — 36415 COLL VENOUS BLD VENIPUNCTURE: CPT

## 2021-11-13 PROCEDURE — 65270000029 HC RM PRIVATE

## 2021-11-13 PROCEDURE — 77010033678 HC OXYGEN DAILY

## 2021-11-13 RX ORDER — FUROSEMIDE 10 MG/ML
40 INJECTION INTRAMUSCULAR; INTRAVENOUS 3 TIMES DAILY
Status: DISCONTINUED | OUTPATIENT
Start: 2021-11-13 | End: 2021-11-15 | Stop reason: HOSPADM

## 2021-11-13 RX ORDER — ACETAMINOPHEN 325 MG/1
650 TABLET ORAL
Status: DISCONTINUED | OUTPATIENT
Start: 2021-11-13 | End: 2021-11-15 | Stop reason: HOSPADM

## 2021-11-13 RX ORDER — ALPRAZOLAM 0.5 MG/1
0.5 TABLET ORAL
Status: DISCONTINUED | OUTPATIENT
Start: 2021-11-13 | End: 2021-11-15 | Stop reason: HOSPADM

## 2021-11-13 RX ORDER — INSULIN LISPRO 100 [IU]/ML
INJECTION, SOLUTION INTRAVENOUS; SUBCUTANEOUS
Status: DISCONTINUED | OUTPATIENT
Start: 2021-11-13 | End: 2021-11-15 | Stop reason: HOSPADM

## 2021-11-13 RX ORDER — IPRATROPIUM BROMIDE AND ALBUTEROL SULFATE 2.5; .5 MG/3ML; MG/3ML
3 SOLUTION RESPIRATORY (INHALATION)
Status: DISCONTINUED | OUTPATIENT
Start: 2021-11-13 | End: 2021-11-15 | Stop reason: HOSPADM

## 2021-11-13 RX ORDER — HYDRALAZINE HYDROCHLORIDE 50 MG/1
50 TABLET, FILM COATED ORAL 3 TIMES DAILY
Status: DISCONTINUED | OUTPATIENT
Start: 2021-11-13 | End: 2021-11-15

## 2021-11-13 RX ORDER — ALOGLIPTIN 12.5 MG/1
6.25 TABLET, FILM COATED ORAL DAILY
Status: DISCONTINUED | OUTPATIENT
Start: 2021-11-13 | End: 2021-11-15 | Stop reason: HOSPADM

## 2021-11-13 RX ADMIN — METOPROLOL TARTRATE 50 MG: 50 TABLET, FILM COATED ORAL at 08:32

## 2021-11-13 RX ADMIN — INSULIN LISPRO 2 UNITS: 100 INJECTION, SOLUTION INTRAVENOUS; SUBCUTANEOUS at 16:43

## 2021-11-13 RX ADMIN — HEPARIN SODIUM 5000 UNITS: 5000 INJECTION INTRAVENOUS; SUBCUTANEOUS at 16:35

## 2021-11-13 RX ADMIN — METHYLPREDNISOLONE SODIUM SUCCINATE 40 MG: 40 INJECTION, POWDER, FOR SOLUTION INTRAMUSCULAR; INTRAVENOUS at 14:46

## 2021-11-13 RX ADMIN — FAMOTIDINE 20 MG: 10 INJECTION, SOLUTION INTRAVENOUS at 21:51

## 2021-11-13 RX ADMIN — HYDRALAZINE HYDROCHLORIDE 20 MG: 20 INJECTION INTRAMUSCULAR; INTRAVENOUS at 17:23

## 2021-11-13 RX ADMIN — ACETAMINOPHEN 650 MG: 325 TABLET ORAL at 22:02

## 2021-11-13 RX ADMIN — IPRATROPIUM BROMIDE AND ALBUTEROL SULFATE 3 ML: .5; 2.5 SOLUTION RESPIRATORY (INHALATION) at 19:33

## 2021-11-13 RX ADMIN — FUROSEMIDE 40 MG: 10 INJECTION, SOLUTION INTRAVENOUS at 08:32

## 2021-11-13 RX ADMIN — ALOGLIPTIN 6.25 MG: 12.5 TABLET, FILM COATED ORAL at 14:41

## 2021-11-13 RX ADMIN — HYDRALAZINE HYDROCHLORIDE 25 MG: 25 TABLET, FILM COATED ORAL at 15:37

## 2021-11-13 RX ADMIN — METHYLPREDNISOLONE SODIUM SUCCINATE 40 MG: 40 INJECTION, POWDER, FOR SOLUTION INTRAMUSCULAR; INTRAVENOUS at 06:19

## 2021-11-13 RX ADMIN — ALPRAZOLAM 0.5 MG: 0.5 TABLET ORAL at 22:02

## 2021-11-13 RX ADMIN — IPRATROPIUM BROMIDE AND ALBUTEROL SULFATE 3 ML: .5; 2.5 SOLUTION RESPIRATORY (INHALATION) at 09:04

## 2021-11-13 RX ADMIN — HEPARIN SODIUM 5000 UNITS: 5000 INJECTION INTRAVENOUS; SUBCUTANEOUS at 05:00

## 2021-11-13 RX ADMIN — HYDRALAZINE HYDROCHLORIDE 20 MG: 20 INJECTION INTRAMUSCULAR; INTRAVENOUS at 12:02

## 2021-11-13 RX ADMIN — HYDRALAZINE HYDROCHLORIDE 25 MG: 25 TABLET, FILM COATED ORAL at 08:32

## 2021-11-13 RX ADMIN — IPRATROPIUM BROMIDE AND ALBUTEROL SULFATE 3 ML: .5; 2.5 SOLUTION RESPIRATORY (INHALATION) at 13:13

## 2021-11-13 RX ADMIN — FUROSEMIDE 40 MG: 10 INJECTION, SOLUTION INTRAVENOUS at 21:50

## 2021-11-13 RX ADMIN — ACETAMINOPHEN 650 MG: 325 TABLET ORAL at 12:01

## 2021-11-13 RX ADMIN — FAMOTIDINE 20 MG: 10 INJECTION, SOLUTION INTRAVENOUS at 08:32

## 2021-11-13 RX ADMIN — HYDRALAZINE HYDROCHLORIDE 50 MG: 50 TABLET, FILM COATED ORAL at 21:51

## 2021-11-13 NOTE — PROGRESS NOTES
Reason for Admission:  SOB                     RUR Score:      14%               Plan for utilizing home health:  No need for home health at this time. PCP: First and Last name:  Rudy Hampton MD     Name of Practice:    Are you a current patient: Yes/No: yes   Approximate date of last visit: yesterday   Can you participate in a virtual visit with your PCP:                     Current Advanced Directive/Advance Care Plan: DNR      Healthcare Decision Maker:   Click here to complete [address] including selection of the Healthcare Decision Maker Relationship (ie \"Primary\")             Primary Decision Maker: Briseyda Montilla - Other Relative - [phone]                  Transition of Care Plan:                    Patient will discharge home alone. Niece will transport home at discharge. Patient lives in a one story home with 3 steps to entrance of front door, 2 steps to entrance of garage door and 10 steps to entrance of back door. Patient reports no current DME, including oxygen. Patient has never used HH, SNF or IRF services. Patient uses [address] for her prescriptions. Patients primary cardiologist is Dr. Jonathan Han at [facility].     Patient address and phone number from demographic sheet confirmed with patient. Patient also reports that her niece listed above has medical POA, but when I asked her if she had papers for us to put on her chart, patient stated the papers were in her safe at home and if we needed them her niece could get them and bring them in.

## 2021-11-13 NOTE — PROGRESS NOTES
Hospitalist Progress Note           Daily Progress Note: 11/13/2021      Subjective: The patient is seen for follow  up.   Congestive heart failure COPD exacerbation, patient noticed shaking    Problem List:  Problem List as of 11/13/2021 Date Reviewed: 7/2/2019          Codes Class Noted - Resolved    Heart failure due to high blood pressure (HCC) ICD-10-CM: I11.0  ICD-9-CM: 402.91  11/12/2021 - Present        Angina ICD-9-CM: 413.9  11/11/2010 - Present        SOB (shortness of breath) ICD-10-CM: R06.02  ICD-9-CM: 786.05  11/11/2010 - Present        Type II or unspecified type diabetes mellitus without mention of complication, not stated as uncontrolled ICD-10-CM: E11.9  ICD-9-CM: 250.00  11/11/2010 - Present        Hypothyroid ICD-10-CM: E03.9  ICD-9-CM: 244.9  11/11/2010 - Present        GERD (gastroesophageal reflux disease) ICD-10-CM: K21.9  ICD-9-CM: 530.81  11/11/2010 - Present        CAD (coronary artery disease) ICD-10-CM: I25.10  ICD-9-CM: 414.00  11/5/2010 - Present              Medications reviewed  Current Facility-Administered Medications   Medication Dose Route Frequency    albuterol-ipratropium (DUO-NEB) 2.5 MG-0.5 MG/3 ML  3 mL Nebulization Q6H RT    acetaminophen (TYLENOL) tablet 650 mg  650 mg Oral Q4H PRN    insulin lispro (HUMALOG) injection   SubCUTAneous AC&HS    alogliptin (NESINA) tablet 6.25 mg  6.25 mg Oral DAILY    furosemide (LASIX) injection 40 mg  40 mg IntraVENous Q12H    metoprolol tartrate (LOPRESSOR) tablet 50 mg  50 mg Oral DAILY    hydrALAZINE (APRESOLINE) 20 mg/mL injection 20 mg  20 mg IntraVENous Q6H    hydrALAZINE (APRESOLINE) tablet 25 mg  25 mg Oral TID    heparin (porcine) injection 5,000 Units  5,000 Units SubCUTAneous Q12H    famotidine (PF) (PEPCID) 20 mg in 0.9% sodium chloride 10 mL injection  20 mg IntraVENous Q12H    [START ON 11/14/2021] levoFLOXacin (LEVAQUIN) 250 mg in D5W IVPB  250 mg IntraVENous Q48H       Review of Systems:   A comprehensive review of systems was negative except for that written in the HPI. Objective:   Physical Exam:     Visit Vitals  BP (!) 178/77 (BP 1 Location: Left upper arm)   Pulse 86   Temp 98.5 °F (36.9 °C)   Resp 18   Ht 5' 2\" (1.575 m)   Wt 47.6 kg (105 lb)   SpO2 98%   BMI 19.20 kg/m²    O2 Flow Rate (L/min): 2 l/min O2 Device: None (Room air)    Temp (24hrs), Av °F (36.7 °C), Min:97.4 °F (36.3 °C), Max:98.5 °F (36.9 °C)    No intake/output data recorded. No intake/output data recorded. General:  Alert, cooperative, no distress, appears stated age. Anxious   Lungs:   Clear to auscultation bilaterally. Chest wall:  No tenderness or deformity. Heart:  Regular rate and rhythm, S1, S2 normal, no murmur, click, rub or gallop. Abdomen:   Soft, non-tender. Bowel sounds normal. No masses,  No organomegaly. Extremities: Extremities normal, atraumatic, no cyanosis or edema. Pulses: 2+ and symmetric all extremities. Skin: Skin color, texture, turgor normal. No rashes or lesions   Neurologic: CNII-XII intact. No gross sensory or motor deficits     Data Review:       Recent Days:  Recent Labs     21  1410   WBC 9.6   HGB 11.7   HCT 37.2        Recent Labs     21  0335 21  1410   * 135*   K 4.4 5.3*    104   CO2 26 23   * 110*   BUN 24* 22*   CREA 1.62* 1.60*   CA 8.7 8.7   ALB  --  3.2*   TBILI  --  0.6   ALT  --  29     No results for input(s): PH, PCO2, PO2, HCO3, FIO2 in the last 72 hours.     24 Hour Results:  Recent Results (from the past 24 hour(s))   NT-PRO BNP    Collection Time: 21  3:35 AM   Result Value Ref Range    NT pro-BNP 12,168 (H) <448 pg/mL   METABOLIC PANEL, BASIC    Collection Time: 21  3:35 AM   Result Value Ref Range    Sodium 135 (L) 136 - 145 mmol/L    Potassium 4.4 3.5 - 5.1 mmol/L    Chloride 104 97 - 108 mmol/L    CO2 26 21 - 32 mmol/L    Anion gap 5 5 - 15 mmol/L    Glucose 156 (H) 65 - 100 mg/dL    BUN 24 (H) 6 - 20 mg/dL    Creatinine 1.62 (H) 0.55 - 1.02 mg/dL    BUN/Creatinine ratio 15 12 - 20      GFR est AA 37 (L) >60 ml/min/1.73m2    GFR est non-AA 31 (L) >60 ml/min/1.73m2    Calcium 8.7 8.5 - 10.1 mg/dL   GLUCOSE, POC    Collection Time: 11/13/21 12:08 PM   Result Value Ref Range    Glucose (POC) 262 (H) 65 - 117 mg/dL    Performed by Shanna Acosta, POC    Collection Time: 11/13/21 12:35 PM   Result Value Ref Range    Glucose (POC) 178 (H) 65 - 117 mg/dL    Performed by Shanna Acosta, POC    Collection Time: 11/13/21  4:17 PM   Result Value Ref Range    Glucose (POC) 182 (H) 65 - 117 mg/dL    Performed by Swathi Puckett    COVID-19 RAPID TEST    Collection Time: 11/13/21  4:50 PM   Result Value Ref Range    Specimen source Nasopharyngeal      COVID-19 rapid test Not Detected Not Detected     SARS-COV-2    Collection Time: 11/13/21  4:50 PM   Result Value Ref Range    SARS-CoV-2 Please find results under separate order             Assessment/   1.congestive heart failure  2.malignant hypertension  3.chronic kidney disease stage III  4.  COPD exacerbation, cannot rule out pneumonia  Plan:   Cont  O2 protocol as tolerated, increase IV Lasix 40 IV  3 times a day, beta-blocker 50 mg p.o. daily due to renal function no ARBs and ACEI, will increase   hydralazine 50 3 times a day, IV hydralazine as needed, her troponin level is normal  Empirically cover patient with IV Levaquin,breathing treatment, IV Solu-Medrol  F/u  2D echo  Check intake output  DVT stress ulcer prophylaxis  Add xanax 0.5 mg po bid for anxiety     Discussed with patient,  per patient she is making her own decision and they she wants DNR    Plan:      Care Plan discussed with: Patient/Family    Total time spent with patient: 30 minutes.     Junius Libman, MD

## 2021-11-13 NOTE — PROGRESS NOTES
Problem: Falls - Risk of  Goal: *Absence of Falls  Description: Document Conda Gamma Fall Risk and appropriate interventions in the flowsheet.   Outcome: Progressing Towards Goal  Note: Fall Risk Interventions:            Medication Interventions: Bed/chair exit alarm    Elimination Interventions: Call light in reach              Problem: Patient Education: Go to Patient Education Activity  Goal: Patient/Family Education  Outcome: Progressing Towards Goal

## 2021-11-14 LAB
ANION GAP SERPL CALC-SCNC: 9 MMOL/L (ref 5–15)
BUN SERPL-MCNC: 52 MG/DL (ref 6–20)
BUN/CREAT SERPL: 23 (ref 12–20)
CA-I BLD-MCNC: 8.6 MG/DL (ref 8.5–10.1)
CHLORIDE SERPL-SCNC: 103 MMOL/L (ref 97–108)
CO2 SERPL-SCNC: 27 MMOL/L (ref 21–32)
CREAT SERPL-MCNC: 2.23 MG/DL (ref 0.55–1.02)
GLUCOSE BLD STRIP.AUTO-MCNC: 111 MG/DL (ref 65–117)
GLUCOSE BLD STRIP.AUTO-MCNC: 144 MG/DL (ref 65–117)
GLUCOSE BLD STRIP.AUTO-MCNC: 149 MG/DL (ref 65–117)
GLUCOSE BLD STRIP.AUTO-MCNC: 153 MG/DL (ref 65–117)
GLUCOSE SERPL-MCNC: 98 MG/DL (ref 65–100)
PERFORMED BY, TECHID: ABNORMAL
PERFORMED BY, TECHID: NORMAL
POTASSIUM SERPL-SCNC: 3.7 MMOL/L (ref 3.5–5.1)
SODIUM SERPL-SCNC: 139 MMOL/L (ref 136–145)

## 2021-11-14 PROCEDURE — 77010033678 HC OXYGEN DAILY

## 2021-11-14 PROCEDURE — 36415 COLL VENOUS BLD VENIPUNCTURE: CPT

## 2021-11-14 PROCEDURE — 65270000029 HC RM PRIVATE

## 2021-11-14 PROCEDURE — 82962 GLUCOSE BLOOD TEST: CPT

## 2021-11-14 PROCEDURE — 94640 AIRWAY INHALATION TREATMENT: CPT

## 2021-11-14 PROCEDURE — 74011000250 HC RX REV CODE- 250: Performed by: FAMILY MEDICINE

## 2021-11-14 PROCEDURE — 80048 BASIC METABOLIC PNL TOTAL CA: CPT

## 2021-11-14 PROCEDURE — 94760 N-INVAS EAR/PLS OXIMETRY 1: CPT

## 2021-11-14 PROCEDURE — 74011250637 HC RX REV CODE- 250/637: Performed by: FAMILY MEDICINE

## 2021-11-14 PROCEDURE — 74011250636 HC RX REV CODE- 250/636: Performed by: FAMILY MEDICINE

## 2021-11-14 RX ORDER — LEVOFLOXACIN 5 MG/ML
500 INJECTION, SOLUTION INTRAVENOUS
Status: DISCONTINUED | OUTPATIENT
Start: 2021-11-14 | End: 2021-11-15

## 2021-11-14 RX ORDER — HYDRALAZINE HYDROCHLORIDE 20 MG/ML
20 INJECTION INTRAMUSCULAR; INTRAVENOUS
Status: DISCONTINUED | OUTPATIENT
Start: 2021-11-14 | End: 2021-11-15 | Stop reason: HOSPADM

## 2021-11-14 RX ORDER — LEVOTHYROXINE SODIUM 100 UG/1
100 TABLET ORAL
Status: DISCONTINUED | OUTPATIENT
Start: 2021-11-15 | End: 2021-11-15 | Stop reason: HOSPADM

## 2021-11-14 RX ADMIN — HYDRALAZINE HYDROCHLORIDE 50 MG: 50 TABLET, FILM COATED ORAL at 20:38

## 2021-11-14 RX ADMIN — HEPARIN SODIUM 5000 UNITS: 5000 INJECTION INTRAVENOUS; SUBCUTANEOUS at 15:53

## 2021-11-14 RX ADMIN — IPRATROPIUM BROMIDE AND ALBUTEROL SULFATE 3 ML: .5; 2.5 SOLUTION RESPIRATORY (INHALATION) at 01:21

## 2021-11-14 RX ADMIN — FAMOTIDINE 20 MG: 10 INJECTION, SOLUTION INTRAVENOUS at 09:04

## 2021-11-14 RX ADMIN — HEPARIN SODIUM 5000 UNITS: 5000 INJECTION INTRAVENOUS; SUBCUTANEOUS at 06:14

## 2021-11-14 RX ADMIN — ACETAMINOPHEN 650 MG: 325 TABLET ORAL at 15:53

## 2021-11-14 RX ADMIN — FUROSEMIDE 40 MG: 10 INJECTION, SOLUTION INTRAVENOUS at 09:09

## 2021-11-14 RX ADMIN — IPRATROPIUM BROMIDE AND ALBUTEROL SULFATE 3 ML: .5; 2.5 SOLUTION RESPIRATORY (INHALATION) at 19:13

## 2021-11-14 RX ADMIN — ALOGLIPTIN 6.25 MG: 12.5 TABLET, FILM COATED ORAL at 09:01

## 2021-11-14 RX ADMIN — METOPROLOL TARTRATE 50 MG: 50 TABLET, FILM COATED ORAL at 09:01

## 2021-11-14 RX ADMIN — LEVOFLOXACIN 500 MG: 500 INJECTION, SOLUTION INTRAVENOUS at 20:40

## 2021-11-14 RX ADMIN — FUROSEMIDE 40 MG: 10 INJECTION, SOLUTION INTRAVENOUS at 13:50

## 2021-11-14 RX ADMIN — FUROSEMIDE 40 MG: 10 INJECTION, SOLUTION INTRAVENOUS at 20:38

## 2021-11-14 RX ADMIN — IPRATROPIUM BROMIDE AND ALBUTEROL SULFATE 3 ML: .5; 2.5 SOLUTION RESPIRATORY (INHALATION) at 07:37

## 2021-11-14 RX ADMIN — IPRATROPIUM BROMIDE AND ALBUTEROL SULFATE 3 ML: .5; 2.5 SOLUTION RESPIRATORY (INHALATION) at 13:18

## 2021-11-14 RX ADMIN — HYDRALAZINE HYDROCHLORIDE 50 MG: 50 TABLET, FILM COATED ORAL at 15:53

## 2021-11-14 RX ADMIN — HYDRALAZINE HYDROCHLORIDE 50 MG: 50 TABLET, FILM COATED ORAL at 09:01

## 2021-11-14 RX ADMIN — FAMOTIDINE 20 MG: 10 INJECTION, SOLUTION INTRAVENOUS at 20:39

## 2021-11-14 RX ADMIN — ALPRAZOLAM 0.5 MG: 0.5 TABLET ORAL at 20:39

## 2021-11-14 NOTE — CONSULTS
Consult    Patient: Chuyita Luna MRN: [id number]  SSN: [id number]    YOB: 1940  Age: 80 y.o. Sex: female      Subjective:      Chuyita Luna is a 80 y.o. female who is being seen for heart failure. Patient is a 72-year-old white female with history of nicotine dependence who smokes about 15 cigarettes a day cutting back from 2 packs every day. She has a history of coronary artery status post CABG and previous history of to myocardial infarction, diabetes mellitus, hypertension. She follows up with Dr. Sarabjit Roblero. She was brought by her niece complaining of 10 pound weight gain and worsening shortness of breath. Denied having any chest pain, palpitation, dizziness or syncope. She has dependent lower extremity swelling. She received IV Lasix and her symptoms has improved. Denied recent change in her medications. She is compliant with medicine.   Unfortunately she is not interested in quitting smoking    Past Medical History:   Diagnosis Date    CAD (coronary artery disease)     [facility]     Essential hypertension     Gastroesophageal reflux disease     Hyperlipidemia     Hypothyroid     Myocardial infarction ([facility]     Osteoarthritis     Peptic ulcer disease     Vitamin B12 deficiency      Past Surgical History:   Procedure Laterality Date    HX ANGIOPLASTY      HX BACK SURGERY      HX CORONARY STENT PLACEMENT      HX HEART CATHETERIZATION      HX HYSTERECTOMY      HX KNEE ARTHROSCOPY      HX SHOULDER ARTHROSCOPY        Family History   Problem Relation Age of Onset    Heart Attack Mother     Hypertension Mother     Diabetes Mother     Colon Cancer Father     Heart Surgery Father      Social History     Tobacco Use    Smoking status: Current Every Day Smoker    Smokeless tobacco: Never Used   Substance Use Topics    Alcohol use: No      Current Facility-Administered Medications   Medication Dose Route Frequency Provider Last Rate Last Admin    [START ON 11/15/2021] levothyroxine (SYNTHROID) tablet 100 mcg  100 mcg Oral Xiomara Purdy MD        hydrALAZINE (APRESOLINE) 20 mg/mL injection 20 mg  20 mg IntraVENous Q6H PRN May MD Hussain        albuterol-ipratropium (DUO-NEB) 2.5 MG-0.5 MG/3 ML  3 mL Nebulization Q6H RT May MD Hussain   3 mL at 11/14/21 1318    acetaminophen (TYLENOL) tablet 650 mg  650 mg Oral Q4H PRN May MD Hussain   650 mg at 11/13/21 2202    insulin lispro (HUMALOG) injection   SubCUTAneous AC&HS May MD Hussain   2 Units at 11/13/21 1643    alogliptin (NESINA) tablet 6.25 mg  6.25 mg Oral DAILY May MD Hussain   6.25 mg at 11/14/21 0901    hydrALAZINE (APRESOLINE) tablet 50 mg  50 mg Oral TID May MD Hussain   50 mg at 11/14/21 0901    ALPRAZolam Mary Reeks) tablet 0.5 mg  0.5 mg Oral BID PRN May MD Hussain   0.5 mg at 11/13/21 2202    furosemide (LASIX) injection 40 mg  40 mg IntraVENous TID Melisa Nixon MD   40 mg at 11/14/21 1350    metoprolol tartrate (LOPRESSOR) tablet 50 mg  50 mg Oral DAILY Melisa Nixon MD   50 mg at 11/14/21 0901    heparin (porcine) injection 5,000 Units  5,000 Units SubCUTAneous Q12H May MD Hussain   5,000 Units at 11/14/21 5021    famotidine (PF) (PEPCID) 20 mg in 0.9% sodium chloride 10 mL injection  20 mg IntraVENous Q12H May MD Hussain   20 mg at 11/14/21 0904    levoFLOXacin (LEVAQUIN) 250 mg in D5W IVPB  250 mg IntraVENous Q48H Melisa Nixon MD            Allergies   Allergen Reactions    Aspirin Other (comments)    Codeine Other (comments)    Morphine Other (comments)    Pcn [Penicillins] Other (comments)       Review of Systems:  A comprehensive review of systems was negative except for that written in the History of Present Illness.     Objective:     Vitals:    11/14/21 0808 11/14/21 1200 11/14/21 1210 11/14/21 1330   BP: (!) 161/69  (!) 168/66    Pulse: 81 80 87    Resp: 18  18    Temp: 97.4 °F (36.3 °C)  98.2 °F (36.8 °C)    SpO2: 94%  97% 93%   Weight:       Height:            Physical Exam:  General:  Alert, cooperative, no distress, appears stated age. Eyes:  Conjunctivae/corneas clear. PERRL, EOMs intact. Fundi benign   Ears:  Normal TMs and external ear canals both ears. Nose: Nares normal. Septum midline. Mucosa normal. No drainage or sinus tenderness. Mouth/Throat: Lips, mucosa, and tongue normal. Teeth and gums normal.   Neck: Supple, symmetrical, trachea midline, no adenopathy, thyroid: no enlargment/tenderness/nodules, no carotid bruit and no JVD. Back:   Symmetric, no curvature. ROM normal. No CVA tenderness. Lungs:    Bibasilar crackles. Heart:  Regular rate and rhythm, S1, S2 normal, no murmur, click, rub or gallop. Abdomen:   Soft, non-tender. Bowel sounds normal. No masses,  No organomegaly. Extremities: Extremities normal, atraumatic, no cyanosis or edema. Pulses: 2+ and symmetric all extremities. Skin: Skin color, texture, turgor normal. No rashes or lesions   Lymph nodes: Cervical, supraclavicular, and axillary nodes normal.   Neurologic: CNII-XII intact. Normal strength, sensation and reflexes throughout. Assessment     Hospital Problems  Date Reviewed: 7/2/2019          Codes Class Noted POA    [facility] ICD-10-CM: I11.0  ICD-9-CM: 402.91  11/12/2021 Unknown            Patient is a 79-year-old white female with:  1. Heart failure with decompensation  2. Nicotine dependence  3. Hypertensive heart disease  4. CAD status post CABG  5. Mixed hyperlipidemia  6. Peptic ulcer disease  7. Osteoarthritis  8. Hypothyroidism  9. GERD  10. Diabetes mellitus  Plan:     White count is 9.6, hemoglobin is 11.7, platelet is 580. Sodium 135, potassium 4.4. BNP has trended up. She feels better since she has been here. She responded well to Lasix. There is no accurate ins and outs. Currently on metoprolol 50 mg. This is a twice a day medicine. Currently levothyroxine.   Hydralazine 50 mg 3 times a day, IV Lasix 3 times a day. We will continue IV Lasix with close monitor of kidney function, ins and outs and daily weight. Check an echocardiogram.    Further recommendation pending on clinical progression, echo finding    Thank you  For involving me in Mrs. Zamora care.   I will follow  Signed By: Glenny West MD     November 14, 2021

## 2021-11-14 NOTE — PROGRESS NOTES
Hospitalist Progress Note           Daily Progress Note: 11/14/2021  Patient was admitted due to shortness of breath, congestive heart failure COPD exacerbation    Subjective: The patient is seen for follow  up.   patient noticed shaking yesterday started with  patient Xanax  Mg po bid prn,now she feels much better , now she is still on nasal cannula oxygen she is not using any home O2, her BNP is still over 10 K, patient is still complaining of congestion currently she is on IV Lasix 3 times a day    Problem List:  Problem List as of 11/14/2021 Date Reviewed: 7/2/2019          Codes Class Noted - Resolved    Heart failure due to high blood pressure ([facility] [address] ICD-10-CM: I11.0  ICD-9-CM: 402.91  11/12/2021 - Present        Angina ICD-9-CM: 413.9  11/11/2010 - Present        SOB (shortness of breath) ICD-10-CM: R06.02  ICD-9-CM: 786.05  11/11/2010 - Present        Type II or unspecified type diabetes mellitus without mention of complication, not stated as uncontrolled ICD-10-CM: E11.9  ICD-9-CM: 250.00  11/11/2010 - Present        Hypothyroid ICD-10-CM: E03.9  ICD-9-CM: 244.9  11/11/2010 - Present        GERD (gastroesophageal reflux disease) ICD-10-CM: K21.9  ICD-9-CM: 530.81  11/11/2010 - Present        CAD (coronary artery disease) ICD-10-CM: I25.10  ICD-9-CM: 414.00  11/5/2010 - Present              Medications reviewed  Current Facility-Administered Medications   Medication Dose Route Frequency    [START ON 11/15/2021] levothyroxine (SYNTHROID) tablet 100 mcg  100 mcg Oral 6am    hydrALAZINE (APRESOLINE) 20 mg/mL injection 20 mg  20 mg IntraVENous Q6H PRN    albuterol-ipratropium (DUO-NEB) 2.5 MG-0.5 MG/3 ML  3 mL Nebulization Q6H RT    acetaminophen (TYLENOL) tablet 650 mg  650 mg Oral Q4H PRN    insulin lispro (HUMALOG) injection   SubCUTAneous AC&HS    alogliptin (NESINA) tablet 6.25 mg  6.25 mg Oral DAILY    hydrALAZINE (APRESOLINE) tablet 50 mg  50 mg Oral TID    ALPRAZolam Valaria Paling) tablet 0.5 mg  0.5 mg Oral BID PRN    furosemide (LASIX) injection 40 mg  40 mg IntraVENous TID    metoprolol tartrate (LOPRESSOR) tablet 50 mg  50 mg Oral DAILY    heparin (porcine) injection 5,000 Units  5,000 Units SubCUTAneous Q12H    famotidine (PF) (PEPCID) 20 mg in 0.9% sodium chloride 10 mL injection  20 mg IntraVENous Q12H    levoFLOXacin (LEVAQUIN) 250 mg in D5W IVPB  250 mg IntraVENous Q48H       Review of Systems:   A comprehensive review of systems was negative except for that written in the HPI. Objective:   Physical Exam:     Visit Vitals  BP (!) 168/66 (BP 1 Location: Left upper arm, BP Patient Position: Supine)   Pulse 87   Temp 98.2 °F (36.8 °C)   Resp 18   Ht 5' 2\" (1.575 m)   Wt 47.6 kg (105 lb)   SpO2 97%   BMI 19.20 kg/m²    O2 Flow Rate (L/min): 2 l/min O2 Device: Nasal cannula    Temp (24hrs), Av.2 °F (36.8 °C), Min:97.4 °F (36.3 °C), Max:98.5 °F (36.9 °C)    No intake/output data recorded. No intake/output data recorded. General:  Alert, cooperative, no distress, appears stated age. Anxious   Lungs:   Clear to auscultation bilaterally. Chest wall:  No tenderness or deformity. Heart:  Regular rate and rhythm, S1, S2 normal, no murmur, click, rub or gallop. Abdomen:   Soft, non-tender. Bowel sounds normal. No masses,  No organomegaly. Extremities: Extremities normal, atraumatic, no cyanosis or edema. Pulses: 2+ and symmetric all extremities. Skin: Skin color, texture, turgor normal. No rashes or lesions   Neurologic: CNII-XII intact.  No gross sensory or motor deficits     Data Review:       Recent Days:  Recent Labs     21  1410   WBC 9.6   HGB 11.7   HCT 37.2        Recent Labs     21  0335 21  1410   * 135*   K 4.4 5.3*    104   CO2 26 23   * 110*   BUN 24* 22*   CREA 1.62* 1.60*   CA 8.7 8.7   ALB  --  3.2*   TBILI  --  0.6   ALT  --  29     No results for input(s): PH, PCO2, PO2, HCO3, FIO2 in the last 72 hours. 24 Hour Results:  Recent Results (from the past 24 hour(s))   GLUCOSE, POC    Collection Time: 11/13/21  4:17 PM   Result Value Ref Range    Glucose (POC) 182 (H) 65 - 117 mg/dL    Performed by Andra Allen    COVID-19 RAPID TEST    Collection Time: 11/13/21  4:50 PM   Result Value Ref Range    Specimen source Nasopharyngeal      COVID-19 rapid test Not Detected Not Detected     SARS-COV-2    Collection Time: 11/13/21  4:50 PM   Result Value Ref Range    SARS-CoV-2 Please find results under separate order     GLUCOSE, POC    Collection Time: 11/13/21 10:04 PM   Result Value Ref Range    Glucose (POC) 200 (H) 65 - 117 mg/dL    Performed by [address] [place] [address], POC    Collection Time: 11/14/21  8:10 AM   Result Value Ref Range    Glucose (POC) 144 (H) 65 - 117 mg/dL    Performed by Jeanine , POC    Collection Time: 11/14/21 12:09 PM   Result Value Ref Range    Glucose (POC) 149 (H) 65 - 117 mg/dL    Performed by Andra Allen            Assessment/   1.congestive heart failure in May 2021 patient had a stress echo showed normal EF, her BNP over 10 K I will repeat limited 2D echo consult cardiology  2.malignant hypertension, BP better controlled  3.chronic kidney disease stage III stable  4.   COPD exacerbation, cannot rule out pneumonia  Plan:   Cont  O2 protocol as tolerated, consult respiratory therapy to check ambulating O2 set  Continue IV Lasix 40 IV  3 times a day, beta-blocker 50 mg p.o. daily due to renal function no ARBs and ACEI, on   hydralazine 50 3 times a day, IV hydralazine as needed, her troponin level is normal  Empirically cover patient with IV Levaquin,breathing treatment, IV Solu-Medrol  F/u  2D echo  Check intake output  DVT stress ulcer prophylaxis  Add xanax 0.5 mg po bid for anxiety     Discussed with patient,  per patient she is making her own decision and they she wants DNR    If continue doing well may discharge patient in next 24 to 48 hours      Care Plan discussed with: Patient/Family    Total time spent with patient: 30 minutes.     Braydon Rueda MD

## 2021-11-14 NOTE — PROGRESS NOTES
Problem: Falls - Risk of  Goal: *Absence of Falls  Description: Document Lizet Nap Fall Risk and appropriate interventions in the flowsheet.   Outcome: Progressing Towards Goal  Note: Fall Risk Interventions:  Mobility Interventions: Communicate number of staff needed for ambulation/transfer, Patient to call before getting OOB         Medication Interventions: Patient to call before getting OOB    Elimination Interventions: Patient to call for help with toileting needs              Problem: Patient Education: Go to Patient Education Activity  Goal: Patient/Family Education  Outcome: Progressing Towards Goal     Problem: Heart Failure: Day 1  Goal: Off Pathway (Use only if patient is Off Pathway)  Outcome: Progressing Towards Goal  Goal: Activity/Safety  Outcome: Progressing Towards Goal  Goal: Consults, if ordered  Outcome: Progressing Towards Goal  Goal: Diagnostic Test/Procedures  Outcome: Progressing Towards Goal  Goal: Nutrition/Diet  Outcome: Progressing Towards Goal  Goal: Discharge Planning  Outcome: Progressing Towards Goal  Goal: Medications  Outcome: Progressing Towards Goal  Goal: Respiratory  Outcome: Progressing Towards Goal  Goal: Treatments/Interventions/Procedures  Outcome: Progressing Towards Goal  Goal: Psychosocial  Outcome: Progressing Towards Goal  Goal: *Oxygen saturation within defined limits  Outcome: Progressing Towards Goal  Goal: *Hemodynamically stable  Outcome: Progressing Towards Goal  Goal: *Optimal pain control at patient's stated goal  Outcome: Progressing Towards Goal  Goal: *Anxiety reduced or absent  Outcome: Progressing Towards Goal     Problem: Heart Failure: Day 3  Goal: Off Pathway (Use only if patient is Off Pathway)  Outcome: Progressing Towards Goal  Goal: Activity/Safety  Outcome: Progressing Towards Goal  Goal: Diagnostic Test/Procedures  Outcome: Progressing Towards Goal  Goal: Nutrition/Diet  Outcome: Progressing Towards Goal  Goal: Discharge Planning  Outcome: Progressing Towards Goal  Goal: Medications  Outcome: Progressing Towards Goal  Goal: Respiratory  Outcome: Progressing Towards Goal  Goal: Treatments/Interventions/Procedures  Outcome: Progressing Towards Goal  Goal: Psychosocial  Outcome: Progressing Towards Goal  Goal: *Oxygen saturation within defined limits  Outcome: Progressing Towards Goal  Goal: *Hemodynamically stable  Outcome: Progressing Towards Goal  Goal: *Optimal pain control at patient's stated goal  Outcome: Progressing Towards Goal  Goal: *Anxiety reduced or absent  Outcome: Progressing Towards Goal  Goal: *Demonstrates progressive activity  Outcome: Progressing Towards Goal

## 2021-11-15 ENCOUNTER — APPOINTMENT (OUTPATIENT)
Dept: GENERAL RADIOLOGY | Age: 81
DRG: 291 | End: 2021-11-15
Attending: FAMILY MEDICINE
Payer: MEDICARE

## 2021-11-15 ENCOUNTER — APPOINTMENT (OUTPATIENT)
Dept: NON INVASIVE DIAGNOSTICS | Age: 81
DRG: 291 | End: 2021-11-15
Attending: FAMILY MEDICINE
Payer: MEDICARE

## 2021-11-15 VITALS
BODY MASS INDEX: 19.32 KG/M2 | SYSTOLIC BLOOD PRESSURE: 156 MMHG | DIASTOLIC BLOOD PRESSURE: 66 MMHG | HEART RATE: 77 BPM | TEMPERATURE: 97.6 F | HEIGHT: 62 IN | OXYGEN SATURATION: 91 % | RESPIRATION RATE: 20 BRPM | WEIGHT: 105 LBS

## 2021-11-15 LAB
ANION GAP SERPL CALC-SCNC: 4 MMOL/L (ref 5–15)
BUN SERPL-MCNC: 44 MG/DL (ref 6–20)
BUN/CREAT SERPL: 23 (ref 12–20)
CA-I BLD-MCNC: 8.3 MG/DL (ref 8.5–10.1)
CHLORIDE SERPL-SCNC: 102 MMOL/L (ref 97–108)
CO2 SERPL-SCNC: 31 MMOL/L (ref 21–32)
CREAT SERPL-MCNC: 1.88 MG/DL (ref 0.55–1.02)
ECHO AO ROOT DIAM: 3.2 CM
ECHO AR MAX VEL PISA: 521 CM/S
ECHO AV AREA PEAK VELOCITY: 3.19 CM2
ECHO AV AREA VTI: 2.88 CM2
ECHO AV AREA/BSA PEAK VELOCITY: 2.2 CM2/M2
ECHO AV AREA/BSA VTI: 2 CM2/M2
ECHO AV MEAN GRADIENT: 3 MMHG
ECHO AV MEAN VELOCITY: 81.7 CM/S
ECHO AV PEAK GRADIENT: 8 MMHG
ECHO AV PEAK VELOCITY: 140 CM/S
ECHO AV REGURGITANT PHT: 350 MS
ECHO AV VTI: 25.1 CM
ECHO EST RA PRESSURE: 10 MMHG
ECHO LA AREA 2C: 18.2 CM2
ECHO LA AREA 4C: 18.1 CM2
ECHO LA MAJOR AXIS: 4.1 CM
ECHO LA MAJOR AXIS: 4.1 CM
ECHO LA MAJOR AXIS: 4.71 CM
ECHO LV E' SEPTAL VELOCITY: 7.69 CM/S
ECHO LV EDV A2C: 52 CM3
ECHO LV EDV A2C: 94.2 CM3
ECHO LV EDV A4C: 48 CM3
ECHO LV ESV A2C: 13.5 CM3
ECHO LV ESV A4C: 8 CM3
ECHO LV INTERNAL DIMENSION DIASTOLIC: 4.55 CM (ref 3.9–5.3)
ECHO LV INTERNAL DIMENSION SYSTOLIC: 2.38 CM
ECHO LV IVSD: 0.99 CM (ref 0.6–0.9)
ECHO LV MASS 2D: 163.6 G (ref 67–162)
ECHO LV MASS INDEX 2D: 112.8 G/M2 (ref 43–95)
ECHO LV POSTERIOR WALL DIASTOLIC: 1.08 CM (ref 0.6–0.9)
ECHO LVOT DIAM: 2.1 CM
ECHO LVOT PEAK GRADIENT: 7 MMHG
ECHO LVOT PEAK VELOCITY: 129 CM/S
ECHO LVOT SV: 72 CM3
ECHO LVOT VTI: 20.9 CM
ECHO LVOT VTI: 20.9 CM
ECHO MV A VELOCITY: 57.6 CM/S
ECHO MV AREA PHT: 5.64 CM2
ECHO MV E VELOCITY: 91 CM/S
ECHO MV E/A RATIO: 1.58
ECHO MV E/E' SEPTAL: 11.83
ECHO MV MAX VELOCITY: 108 CM/S
ECHO MV MEAN GRADIENT: 2 MMHG
ECHO MV PEAK GRADIENT: 5 MMHG
ECHO MV PRESSURE HALF TIME (PHT): 39 MS
ECHO MV REGURGITANT VTIA: 214 CM
ECHO MV VTI: 23.6 CM
ECHO PV MAX VELOCITY: 117 CM/S
ECHO PV MEAN GRADIENT: 3 MMHG
ECHO PV PEAK INSTANTANEOUS GRADIENT SYSTOLIC: 5 MMHG
ECHO PV VTI: 20.7 CM
ECHO RA AREA 4C: 14.3 CM2
ECHO RIGHT VENTRICULAR SYSTOLIC PRESSURE (RVSP): 54 MMHG
ECHO TV MAX VELOCITY: 330 CM/S
ECHO TV MEAN GRADIENT: 131 MMHG
ECHO TV REGURGITANT PEAK GRADIENT: 44 MMHG
GLUCOSE BLD STRIP.AUTO-MCNC: 116 MG/DL (ref 65–117)
GLUCOSE BLD STRIP.AUTO-MCNC: 132 MG/DL (ref 65–117)
GLUCOSE BLD STRIP.AUTO-MCNC: 144 MG/DL (ref 65–117)
GLUCOSE SERPL-MCNC: 154 MG/DL (ref 65–100)
LA VOL DISK BP: 54 CM3 (ref 22–52)
LVOT MG: 3 MMHG
MV DEC SLOPE: 6770 MM/S2
MV DEC SLOPE: 6770 MM/S2
PERFORMED BY, TECHID: ABNORMAL
PERFORMED BY, TECHID: ABNORMAL
PERFORMED BY, TECHID: NORMAL
POTASSIUM SERPL-SCNC: 3.4 MMOL/L (ref 3.5–5.1)
SODIUM SERPL-SCNC: 137 MMOL/L (ref 136–145)

## 2021-11-15 PROCEDURE — 77010033678 HC OXYGEN DAILY

## 2021-11-15 PROCEDURE — 97161 PT EVAL LOW COMPLEX 20 MIN: CPT

## 2021-11-15 PROCEDURE — 74011250636 HC RX REV CODE- 250/636: Performed by: HOSPITALIST

## 2021-11-15 PROCEDURE — 36415 COLL VENOUS BLD VENIPUNCTURE: CPT

## 2021-11-15 PROCEDURE — 93308 TTE F-UP OR LMTD: CPT

## 2021-11-15 PROCEDURE — 74011250637 HC RX REV CODE- 250/637: Performed by: FAMILY MEDICINE

## 2021-11-15 PROCEDURE — 74011636637 HC RX REV CODE- 636/637: Performed by: FAMILY MEDICINE

## 2021-11-15 PROCEDURE — 74011000250 HC RX REV CODE- 250: Performed by: FAMILY MEDICINE

## 2021-11-15 PROCEDURE — 94760 N-INVAS EAR/PLS OXIMETRY 1: CPT

## 2021-11-15 PROCEDURE — 97530 THERAPEUTIC ACTIVITIES: CPT

## 2021-11-15 PROCEDURE — 82962 GLUCOSE BLOOD TEST: CPT

## 2021-11-15 PROCEDURE — 80048 BASIC METABOLIC PNL TOTAL CA: CPT

## 2021-11-15 PROCEDURE — 74011250637 HC RX REV CODE- 250/637: Performed by: HOSPITALIST

## 2021-11-15 PROCEDURE — 94640 AIRWAY INHALATION TREATMENT: CPT

## 2021-11-15 PROCEDURE — 71045 X-RAY EXAM CHEST 1 VIEW: CPT

## 2021-11-15 PROCEDURE — 74011250636 HC RX REV CODE- 250/636: Performed by: INTERNAL MEDICINE

## 2021-11-15 PROCEDURE — 74011250636 HC RX REV CODE- 250/636: Performed by: FAMILY MEDICINE

## 2021-11-15 RX ORDER — HYDRALAZINE HYDROCHLORIDE 100 MG/1
100 TABLET, FILM COATED ORAL 3 TIMES DAILY
Qty: 90 TABLET | Refills: 2 | Status: SHIPPED | OUTPATIENT
Start: 2021-11-15

## 2021-11-15 RX ORDER — HYDRALAZINE HYDROCHLORIDE 20 MG/ML
10 INJECTION INTRAMUSCULAR; INTRAVENOUS ONCE
Status: COMPLETED | OUTPATIENT
Start: 2021-11-15 | End: 2021-11-15

## 2021-11-15 RX ORDER — HYDRALAZINE HYDROCHLORIDE 50 MG/1
100 TABLET, FILM COATED ORAL 3 TIMES DAILY
Status: DISCONTINUED | OUTPATIENT
Start: 2021-11-15 | End: 2021-11-15 | Stop reason: HOSPADM

## 2021-11-15 RX ORDER — LABETALOL HCL 20 MG/4 ML
10 SYRINGE (ML) INTRAVENOUS
Status: DISCONTINUED | OUTPATIENT
Start: 2021-11-15 | End: 2021-11-15 | Stop reason: HOSPADM

## 2021-11-15 RX ORDER — HYDRALAZINE HYDROCHLORIDE 100 MG/1
100 TABLET, FILM COATED ORAL 3 TIMES DAILY
Qty: 90 TABLET | Refills: 2 | Status: SHIPPED | OUTPATIENT
Start: 2021-11-15 | End: 2021-11-15 | Stop reason: SDUPTHER

## 2021-11-15 RX ORDER — METOPROLOL TARTRATE 25 MG/1
25 TABLET, FILM COATED ORAL 2 TIMES DAILY
Qty: 60 TABLET | Refills: 2 | Status: SHIPPED
Start: 2021-11-15 | End: 2021-11-15

## 2021-11-15 RX ORDER — CARVEDILOL 12.5 MG/1
25 TABLET ORAL 2 TIMES DAILY WITH MEALS
Status: DISCONTINUED | OUTPATIENT
Start: 2021-11-15 | End: 2021-11-15 | Stop reason: HOSPADM

## 2021-11-15 RX ORDER — CLONIDINE HYDROCHLORIDE 0.1 MG/1
0.1 TABLET ORAL 2 TIMES DAILY
Qty: 60 TABLET | Refills: 2 | Status: SHIPPED | OUTPATIENT
Start: 2021-11-15

## 2021-11-15 RX ORDER — FUROSEMIDE 40 MG/1
40 TABLET ORAL DAILY
Qty: 30 TABLET | Refills: 2 | Status: SHIPPED | OUTPATIENT
Start: 2021-11-15 | End: 2021-11-15 | Stop reason: SDUPTHER

## 2021-11-15 RX ORDER — CLONIDINE HYDROCHLORIDE 0.1 MG/1
0.1 TABLET ORAL
Status: COMPLETED | OUTPATIENT
Start: 2021-11-15 | End: 2021-11-15

## 2021-11-15 RX ORDER — CARVEDILOL 25 MG/1
25 TABLET ORAL 2 TIMES DAILY WITH MEALS
Qty: 60 TABLET | Refills: 2 | Status: SHIPPED | OUTPATIENT
Start: 2021-11-15

## 2021-11-15 RX ORDER — CARVEDILOL 25 MG/1
25 TABLET ORAL 2 TIMES DAILY WITH MEALS
Qty: 60 TABLET | Refills: 2 | Status: SHIPPED | OUTPATIENT
Start: 2021-11-15 | End: 2021-11-15 | Stop reason: SDUPTHER

## 2021-11-15 RX ORDER — FUROSEMIDE 40 MG/1
40 TABLET ORAL DAILY
Qty: 30 TABLET | Refills: 2 | Status: SHIPPED | OUTPATIENT
Start: 2021-11-15

## 2021-11-15 RX ORDER — CLONIDINE HYDROCHLORIDE 0.1 MG/1
0.1 TABLET ORAL
Qty: 60 TABLET | Refills: 2 | Status: SHIPPED | OUTPATIENT
Start: 2021-11-15 | End: 2021-11-15 | Stop reason: SDUPTHER

## 2021-11-15 RX ORDER — METOPROLOL TARTRATE 25 MG/1
25 TABLET, FILM COATED ORAL 2 TIMES DAILY
Status: DISCONTINUED | OUTPATIENT
Start: 2021-11-15 | End: 2021-11-15

## 2021-11-15 RX ADMIN — CARVEDILOL 25 MG: 12.5 TABLET, FILM COATED ORAL at 16:32

## 2021-11-15 RX ADMIN — FUROSEMIDE 40 MG: 10 INJECTION, SOLUTION INTRAVENOUS at 09:11

## 2021-11-15 RX ADMIN — IPRATROPIUM BROMIDE AND ALBUTEROL SULFATE 3 ML: .5; 2.5 SOLUTION RESPIRATORY (INHALATION) at 08:14

## 2021-11-15 RX ADMIN — IPRATROPIUM BROMIDE AND ALBUTEROL SULFATE 3 ML: .5; 2.5 SOLUTION RESPIRATORY (INHALATION) at 01:07

## 2021-11-15 RX ADMIN — HYDRALAZINE HYDROCHLORIDE 10 MG: 20 INJECTION INTRAMUSCULAR; INTRAVENOUS at 16:32

## 2021-11-15 RX ADMIN — INSULIN LISPRO 2 UNITS: 100 INJECTION, SOLUTION INTRAVENOUS; SUBCUTANEOUS at 12:02

## 2021-11-15 RX ADMIN — FAMOTIDINE 20 MG: 10 INJECTION, SOLUTION INTRAVENOUS at 09:11

## 2021-11-15 RX ADMIN — HEPARIN SODIUM 5000 UNITS: 5000 INJECTION INTRAVENOUS; SUBCUTANEOUS at 05:32

## 2021-11-15 RX ADMIN — HYDRALAZINE HYDROCHLORIDE 10 MG: 20 INJECTION INTRAMUSCULAR; INTRAVENOUS at 12:32

## 2021-11-15 RX ADMIN — CLONIDINE HYDROCHLORIDE 0.1 MG: 0.1 TABLET ORAL at 16:32

## 2021-11-15 RX ADMIN — ALOGLIPTIN 6.25 MG: 12.5 TABLET, FILM COATED ORAL at 09:12

## 2021-11-15 RX ADMIN — ACETAMINOPHEN 650 MG: 325 TABLET ORAL at 12:03

## 2021-11-15 RX ADMIN — LEVOTHYROXINE SODIUM 100 MCG: 0.1 TABLET ORAL at 05:32

## 2021-11-15 RX ADMIN — HYDRALAZINE HYDROCHLORIDE 50 MG: 50 TABLET, FILM COATED ORAL at 09:11

## 2021-11-15 RX ADMIN — IPRATROPIUM BROMIDE AND ALBUTEROL SULFATE 3 ML: .5; 2.5 SOLUTION RESPIRATORY (INHALATION) at 14:04

## 2021-11-15 RX ADMIN — HYDRALAZINE HYDROCHLORIDE 100 MG: 50 TABLET, FILM COATED ORAL at 15:40

## 2021-11-15 RX ADMIN — METOPROLOL TARTRATE 50 MG: 50 TABLET, FILM COATED ORAL at 09:11

## 2021-11-15 NOTE — PROGRESS NOTES
CM [facility] by PT, however patient is asleep at this time and did not wake to knock on door or name being called. CM continues to follow, CM to follow up at a later time.

## 2021-11-15 NOTE — DISCHARGE SUMMARY
Physician Discharge Summary     Patient ID:    Paolo Low  [id number]  80 y.o.  1940    Admit date: 11/12/2021    Discharge date : 11/15/2021    Chronic Diagnoses:    Problem List as of 11/15/2021 Date Reviewed: 7/2/2019          Codes Class Noted - Resolved    Heart failure due to high blood pressure ([facility] ICD-10-CM: I11.0  ICD-9-CM: 402.91  11/12/2021 - Present        Angina ICD-9-CM: 413.9  11/11/2010 - Present        SOB (shortness of breath) ICD-10-CM: R06.02  ICD-9-CM: 786.05  11/11/2010 - Present        Type II or unspecified type diabetes mellitus without mention of complication, not stated as uncontrolled ICD-10-CM: E11.9  ICD-9-CM: 250.00  11/11/2010 - Present        Hypothyroid ICD-10-CM: E03.9  ICD-9-CM: 244.9  11/11/2010 - Present        GERD (gastroesophageal reflux disease) ICD-10-CM: K21.9  ICD-9-CM: 530.81  11/11/2010 - Present        CAD (coronary artery disease) ICD-10-CM: I25.10  ICD-9-CM: 414.00  11/5/2010 - Present          22    Final Diagnoses:   Heart failure due to high blood pressure ([facility] [I11.0]    Reason for Hospitalization:   AECHF unknown EF  COPD  HTN urgency      Hospital Course:     81M, H/o CHF unknown EF, COPD not on oxygen with acute hypoxic respiratory failure  S/t AECHF unknown EF    She was started on lasix, her symptoms improved. Her BP was controlled with changes made as follows    She is supposed to f/u with PCP in 1 week              INSTRUCTIONS ON DISCHARGE:    Please STOP LOSARTAN and BISOPROLOL and metoprolol    Your new medications:         HYDRALAZINE 100 mg three times a day         LASIX 40 mg daily          COREG25 mg twice a day         CLONIDINE 01.mg twice a day       F/u with PCP and cardiology in 1 week        Discharge Medications:   Current Discharge Medication List      START taking these medications    Details   furosemide (Lasix) 40 mg tablet Take 1 Tablet by mouth daily.   Qty: 30 Tablet, Refills: 2  Start date: 11/15/2021 hydrALAZINE (APRESOLINE) 100 mg tablet Take 1 Tablet by mouth three (3) times daily. Qty: 90 Tablet, Refills: 2  Start date: 11/15/2021      carvediloL (COREG) 25 mg tablet Take 1 Tablet by mouth two (2) times daily (with meals). Qty: 60 Tablet, Refills: 2  Start date: 11/15/2021         CONTINUE these medications which have NOT CHANGED    Details   levothyroxine (SYNTHROID) 50 mcg tablet Take 2 Tabs by mouth Daily (before breakfast). Qty: 180 Tab, Refills: 4    Comments: I want her to be on plain, color free levothyroxine  Associated Diagnoses: Hypothyroidism due to Hashimoto's thyroiditis      liothyronine (CYTOMEL) 5 mcg tablet Take 1 Tab by mouth daily. Qty: 90 Tab, Refills: 4    Associated Diagnoses: Hypothyroidism due to Hashimoto's thyroiditis      teriparatide (FORTEO) 20 mcg/dose - 600 mcg/2.4 mL pnij injection 0.08 mL by SubCUTAneous route daily. Qty: 2.4 mL, Refills: 6      SITagliptin (JANUVIA) 100 mg tablet Take 100 mg by mouth daily. Associated Diagnoses: Hypothyroidism due to Hashimoto's thyroiditis      clopidogrel (PLAVIX) 75 mg tablet Take  by mouth daily. CYANOCOBALAMIN, VITAMIN B-12, (VITAMIN B-12 PO) Take  by mouth. STOP taking these medications       losartan (COZAAR) 50 mg tablet Comments:   Reason for Stopping:         bisoprolol (ZEBETA) 5 mg tablet Comments:   Reason for Stopping:         Ramipril 2.5 mg Tab Comments:   Reason for Stopping: Follow up Care:    1. Floridalma Mason MD in 1-2 weeks. Please call to set up an appointment shortly after discharge. Diet:  carduiac    Disposition:  Home [facility]    Advanced Directive:   FULL x   DNR      Discharge Exam:  General:  Alert, cooperative, no distress, appears stated age. Anxious   Lungs:   Clear to auscultation bilaterally. Chest wall:  No tenderness or deformity. Heart:  Regular rate and rhythm, S1, S2 normal, no murmur, click, rub or gallop. Abdomen:   Soft, non-tender.  Bowel sounds normal. No masses,  No organomegaly. Extremities: Extremities normal, atraumatic, no cyanosis or edema. Pulses: 2+ and symmetric all extremities. Skin: Skin color, texture, turgor normal. No rashes or lesions   Neurologic: CNII-XII intact. No gross sensory or motor deficits         CONSULTATIONS: cardiology    Significant Diagnostic Studies:     Radiologic Studies -   Results from Hospital Encounter encounter on 11/12/21    XR CHEST PORT    Narrative  Portable upright radiograph chest 9:03 a.m. compared with November 12, 2021. INDICATION: Follow-up pulmonary edema. Patient is status post median sternotomy. Heart size remains enlarged with an  atherosclerotic aorta. Vascular congestion/edema pattern is essentially  unchanged given the shallower depth of inspiration. Right pleural effusion. No  pneumothorax. No acute bony findings. Impression  No significant change in CHF pattern with right pleural effusion.      CT Results  (Last 48 hours)    None              Discharge time spent 35 minutes    Signed:  Jessica Middleton MD  11/15/2021  2:47 PM

## 2021-11-15 NOTE — PROGRESS NOTES
PHYSICAL THERAPY EVALUATION/DISCHARGE  Patient: Malissa Baldwin (78 y.o. female)  Date: 11/15/2021  Primary Diagnosis: Heart failure due to high blood pressure (HCC) [I11.0]       Precautions: standard       ASSESSMENT  Pt is a 79 yo female admitted on 11/12/2021 for worsening SOB, currently on 1L O2, no O2 at home; pt being treated for CHF, malignant HTN, CKD III, and COPD exacerbation. PMH: CAD, DM, HTN, GERD, HLD, hypothyroid, MI, osteoarthritis, peptic ulcer disease, and vit B12 deficiency. Pt A&O x 4. Per pt report pt resides alone in a 1 story home with 2 JAVAD, left handrail, pt was I for ADLS/IADLS, no AD with mobility prior to admission. DME: none. Based on the objective data described below, the patient presents at functional baseline for mobility and amb. Pt semi-supine in bed upon PT arrival, agreeable to evaluation. Pt required mod I for bed mobility, supine <> sit and sit <> stand transfers. Pt amb 120 feet in hallway with gt belt, no AD, and mod I; demonstrates slow, steady, step through gt pattern with no LOB or knee buckling noted. Pt did well with session today, no c/o SOB despite noted increased RR, pt reports she is at her baseline and does not understand why she is still on supplemental oxygen. Pt has no skilled acute PT needs at this time noted by PT or reported by pt, will DC skilled PT following evaluation; pt verbalized understanding and agreement. Pt is agreeable to home health therapy if needed.     Current Level of Function Impacting Discharge (mobility/balance): mod I    Other factors to consider for discharge: at baseline     PLAN :  Recommendations and Planned Interventions: DC from skilled PT services following evaluation    Frequency/Duration: Patient will be followed by physical therapy: DC from services following evaluation due to pt being at functional baseline; no skilled therapy required during admission, please reorder if needed     Recommendation for discharge: (in order for the patient to meet his/her long term goals)  Home with [address] [place] [address]    This discharge recommendation:  Has been made in collaboration with the attending provider and/or case management    IF patient discharges home will need the following DME: none       SUBJECTIVE:   Patient stated I don't know why I'm still on oxygen.     OBJECTIVE DATA SUMMARY:   HISTORY:    Past Medical History:   Diagnosis Date    CAD (coronary artery disease)     Diabetes ([facility]     Essential hypertension     Gastroesophageal reflux disease     Hyperlipidemia     Hypothyroid     Myocardial infarction ([facility] [facility]     Osteoarthritis     Peptic ulcer disease     Vitamin B12 deficiency      Past Surgical History:   Procedure Laterality Date    HX ANGIOPLASTY      HX BACK SURGERY      HX CORONARY STENT PLACEMENT      HX HEART CATHETERIZATION      HX HYSTERECTOMY      HX KNEE ARTHROSCOPY      HX SHOULDER ARTHROSCOPY         Home Situation  Home Environment: Private residence  # Steps to Enter: 2  Rails to Enter: Yes  Hand Rails : Left  One/Two Story Residence: One story  Living Alone: Yes  Support Systems: Other Family Member(s)  Patient Expects to be Discharged to[de-identified] House  Current DME Used/Available at Home: None    EXAMINATION/PRESENTATION/DECISION MAKING:   Critical Behavior:  Neurologic State: Alert  Orientation Level: Oriented X4        Hearing: Auditory  Auditory Impairment: Hard of hearing, bilateral, Hearing aid(s)  Hearing Aids/Status: At home  Skin:  Intact where visible  Edema: none noted   Range Of Motion:  AROM: Within functional limits           PROM: Within functional limits           Strength:    Strength:  Within functional limits           Functional Mobility:  Bed Mobility:  Rolling: Modified independent; Bed Modified  Supine to Sit: Modified independent; Bed Modified  Sit to Supine: Independent  Scooting: Independent  Transfers:  Sit to Stand: Modified independent  Stand to Sit: Modified independent Balance:   Sitting: Intact; Without support  Standing: Intact; Without support  Ambulation/Gait Training:  Distance (ft): 120 Feet (ft)  Assistive Device: Gait belt  Ambulation - Level of Assistance: Modified independent     Gait Description (WDL): Exceptions to WDL           Therapeutic Exercises:   Not completed this session    Functional Measure:  [facility] Mobility Inpatient Short Form  How much difficulty does the patient currently have. .. Unable A Lot A Little None   1. Turning over in bed (including adjusting bedclothes, sheets and blankets)? [] 1   [] 2   [] 3   [x] 4   2. Sitting down on and standing up from a chair with arms ( e.g., wheelchair, bedside commode, etc.)   [] 1   [] 2   [] 3   [x] 4   3. Moving from lying on back to sitting on the side of the bed? [] 1   [] 2   [] 3   [x] 4          How much help from another person does the patient currently need. .. Total A Lot A Little None   4. Moving to and from a bed to a chair (including a wheelchair)? [] 1   [] 2   [] 3   [x] 4   5. Need to walk in hospital room? [] 1   [] 2   [] 3   [x] 4   6. Climbing 3-5 steps with a railing? [] 1   [] 2   [x] 3   [] 4   © 2007, Trustees of [address] [place] [address] [address] [id number], under license to [employer]. All rights reserved     Score:  Initial: 23/24 Most Recent: X (Date: 11/15/21)   Interpretation of Tool:  Represents activities that are increasingly more difficult (i.e. Bed mobility, Transfers, Gait).   Score 24 23 22-20 19-15 14-10 9-7 6   Modifier CH CI CJ CK CL CM CN          Physical Therapy Evaluation Charge Determination   History Examination Presentation Decision-Making   HIGH Complexity :3+ comorbidities / personal factors will impact the outcome/ POC  HIGH Complexity : 4+ Standardized tests and measures addressing body structure, function, activity limitation and / or participation in recreation  LOW Complexity : Stable, uncomplicated  Other outcome measures [facility] 6  low      Based on the above components, the patient evaluation is determined to be of the following complexity level: LOW     Pain Ratin/10    Activity Tolerance:   Good, desaturates with exertion and requires oxygen and observed SOB with activity but denies SOB with mobility     After treatment patient left in no apparent distress:   Supine in bed and transport in room taking pt to CVL, nsg updated      COMMUNICATION/EDUCATION:   The patients plan of care was discussed with: Occupational therapist and Registered nurse. Fall prevention education was provided and the patient/caregiver indicated understanding., Patient/family have participated as able in goal setting and plan of care. and Patient/family agree to work toward stated goals and plan of care.     Thank you for this referral.  Luis Lee, PT, DPT   Time Calculation: 22 mins

## 2021-11-15 NOTE — PROGRESS NOTES
CM met with patient to discuss DCP, patient is agreeable to [facility], gave no preference for [facility] agency, referral sent via dieter, patient accepted with [facility], they have accepted. [facility] 11/16/2021. Medicare pt has received, reviewed, and signed 2nd IM letter informing them of their right to appeal the discharge. Signed copied has been placed on pt bedside chart.

## 2021-11-15 NOTE — PROGRESS NOTES
Progress Note    Patient: Kathy Churchill MRN: [id number]  SSN: [id number]    YOB: 1940  Age: 80 y.o. Sex: female      Admit Date: 11/12/2021    LOS: 3 days     Subjective:     No acute events overnight    Objective:     Vitals:    11/15/21 1200 11/15/21 1401 11/15/21 1404 11/15/21 1411   BP:  (!) 154/64     Pulse: 77      Resp:       Temp:       SpO2:   92% 90%   Weight:       Height:            Intake and Output:  Current Shift: No intake/output data recorded. Last three shifts: 11/13 1901 - 11/15 0700  In: -   Out: 1550 [Urine:1550]    Physical Exam:   General:  Alert, cooperative, no distress, appears stated age. Eyes:  Conjunctivae/corneas clear. PERRL, EOMs intact. Fundi benign   Ears:  Normal TMs and external ear canals both ears. Nose: Nares normal. Septum midline. Mucosa normal. No drainage or sinus tenderness. Mouth/Throat: Lips, mucosa, and tongue normal. Teeth and gums normal.   Neck: Supple, symmetrical, trachea midline, no adenopathy, thyroid: no enlargment/tenderness/nodules, no carotid bruit and no JVD. Back:   Symmetric, no curvature. ROM normal. No CVA tenderness. Lungs:   Clear to auscultation bilaterally. Heart:  Regular rate and rhythm, S1, S2 normal, no murmur, click, rub or gallop. Abdomen:   Soft, non-tender. Bowel sounds normal. No masses,  No organomegaly. Extremities: Extremities normal, atraumatic, no cyanosis or edema. Pulses: 2+ and symmetric all extremities. Skin: Skin color, texture, turgor normal. No rashes or lesions   Lymph nodes: Cervical, supraclavicular, and axillary nodes normal.   Neurologic: CNII-XII intact. Normal strength, sensation and reflexes throughout. Lab/Data Review: All lab results for the last 24 hours reviewed. Assessment:     Active Problems:    Heart failure due to high blood pressure ([facility] (11/12/2021)    Patient is a 21-year-old white female with:  1. Heart failure with decompensation  2.   Nicotine dependence  3. Hypertensive heart disease  4. CAD status post CABG  5. Mixed hyperlipidemia  6. Peptic ulcer disease  7. Osteoarthritis  8. Hypothyroidism  9. GERD  10. Diabetes mellitus    Plan:     She had improvement of her symptoms. Kidney function has improved. Creatinine is down to 1.8. BUN of 44. Patient will need a close follow-up with primary care physician Dr. Quinton Pastor with a BMP. She will follow up with Dr. Chick Dancer her cardiologist.  Blood pressure was elevated. She received hydralazine.     Signed By: Eduardo Nova MD     November 15, 2021

## 2021-11-15 NOTE — DISCHARGE INSTRUCTIONS
INSTRUCTIONS ON DISCHARGE:    Please STOP LOSARTAN and BISOPROLOL and metoprolol    Your new medications:         HYDRALAZINE 100 mg three times a day         LASIX 40 mg daily          COREG25 mg twice a day         CLONIDINE 0.1mg twice a day      F/u with PCP and cardiology in 1 week

## 2021-11-15 NOTE — PROGRESS NOTES
OT eval order received and acknowledged. Screen completed as pt reporting being at baseline functional status for self care and functional transfers/mobility (PT confirming information). Pt educated on energy conservation techniques with pt verbalizing understanding. Pt with no acute OT needs at this time thus will D/C from skilled OT services after eval. Recommend d/c to home with [address] [place] [address] and shower chair when medically appropriate. Thank you.

## 2021-11-15 NOTE — PROGRESS NOTES
Patient given discharge instructions. IV removed. Telemetry removed and returned. Patient will be transferred outside in wheelchair. Patient told to may their own appointments. Verbalized understanding. Discharge plan of care/case management plan validated with provider discharge order.

## 2021-11-15 NOTE — PROGRESS NOTES
Discharge instructions discussed. Pt informed on new medications, continued medications, and stopped medications. Pt will be called with appointments. Pt verbalized understanding. Pt safe for discharge per Dr. Rosas Frederick   Discharge plan of care/case management plan validated with provider discharge order.

## 2021-11-19 ENCOUNTER — HOSPITAL ENCOUNTER (EMERGENCY)
Age: 81
Discharge: HOME | End: 2021-11-19
Attending: EMERGENCY MEDICINE
Payer: MEDICARE

## 2021-11-19 ENCOUNTER — APPOINTMENT (OUTPATIENT)
Dept: GENERAL RADIOLOGY | Age: 81
End: 2021-11-19
Attending: EMERGENCY MEDICINE
Payer: MEDICARE

## 2021-11-19 VITALS
WEIGHT: 112 LBS | BODY MASS INDEX: 20.61 KG/M2 | SYSTOLIC BLOOD PRESSURE: 144 MMHG | RESPIRATION RATE: 26 BRPM | HEIGHT: 62 IN | OXYGEN SATURATION: 95 % | DIASTOLIC BLOOD PRESSURE: 66 MMHG | HEART RATE: 69 BPM | TEMPERATURE: 97.9 F

## 2021-11-19 DIAGNOSIS — R06.02 SOB (SHORTNESS OF BREATH): ICD-10-CM

## 2021-11-19 DIAGNOSIS — R33.9 URINARY RETENTION: Primary | ICD-10-CM

## 2021-11-19 DIAGNOSIS — Z97.8 FOLEY CATHETER PRESENT: ICD-10-CM

## 2021-11-19 LAB
ALBUMIN SERPL-MCNC: 3.2 G/DL (ref 3.5–5)
ALBUMIN/GLOB SERPL: 0.8 {RATIO} (ref 1.1–2.2)
ALP SERPL-CCNC: 100 U/L (ref 45–117)
ALT SERPL-CCNC: 30 U/L (ref 12–78)
ANION GAP SERPL CALC-SCNC: 10 MMOL/L (ref 5–15)
APPEARANCE UR: CLEAR
AST SERPL W P-5'-P-CCNC: 22 U/L (ref 15–37)
ATRIAL RATE: 62 BPM
BASOPHILS # BLD: 0 K/UL (ref 0–0.1)
BASOPHILS NFR BLD: 0 % (ref 0–1)
BILIRUB SERPL-MCNC: 0.5 MG/DL (ref 0.2–1)
BILIRUB UR QL: NEGATIVE
BNP SERPL-MCNC: 7668 PG/ML
BUN SERPL-MCNC: 24 MG/DL (ref 6–20)
BUN/CREAT SERPL: 16 (ref 12–20)
CA-I BLD-MCNC: 8.5 MG/DL (ref 8.5–10.1)
CALCULATED P AXIS, ECG09: 53 DEGREES
CALCULATED R AXIS, ECG10: 13 DEGREES
CALCULATED T AXIS, ECG11: -17 DEGREES
CHLORIDE SERPL-SCNC: 94 MMOL/L (ref 97–108)
CO2 SERPL-SCNC: 26 MMOL/L (ref 21–32)
COLOR UR: YELLOW
CREAT SERPL-MCNC: 1.47 MG/DL (ref 0.55–1.02)
DIAGNOSIS, 93000: NORMAL
DIFFERENTIAL METHOD BLD: ABNORMAL
EOSINOPHIL # BLD: 0 K/UL (ref 0–0.4)
EOSINOPHIL NFR BLD: 0 % (ref 0–7)
ERYTHROCYTE [DISTWIDTH] IN BLOOD BY AUTOMATED COUNT: 16.5 % (ref 11.5–14.5)
GLOBULIN SER CALC-MCNC: 4.1 G/DL (ref 2–4)
GLUCOSE SERPL-MCNC: 137 MG/DL (ref 65–100)
GLUCOSE UR STRIP.AUTO-MCNC: NEGATIVE MG/DL
HCT VFR BLD AUTO: 31.4 % (ref 35–47)
HGB BLD-MCNC: 10.2 G/DL (ref 11.5–16)
HGB UR QL STRIP: NEGATIVE
IMM GRANULOCYTES # BLD AUTO: 0 K/UL (ref 0–0.04)
IMM GRANULOCYTES NFR BLD AUTO: 0 % (ref 0–0.5)
KETONES UR QL STRIP.AUTO: NEGATIVE MG/DL
LEUKOCYTE ESTERASE UR QL STRIP.AUTO: NEGATIVE
LYMPHOCYTES # BLD: 0.9 K/UL (ref 0.8–3.5)
LYMPHOCYTES NFR BLD: 10 % (ref 12–49)
MCH RBC QN AUTO: 28.8 PG (ref 26–34)
MCHC RBC AUTO-ENTMCNC: 32.5 G/DL (ref 30–36.5)
MCV RBC AUTO: 88.7 FL (ref 80–99)
MONOCYTES # BLD: 1 K/UL (ref 0–1)
MONOCYTES NFR BLD: 11 % (ref 5–13)
NEUTS SEG # BLD: 7 K/UL (ref 1.8–8)
NEUTS SEG NFR BLD: 79 % (ref 32–75)
NITRITE UR QL STRIP.AUTO: NEGATIVE
P-R INTERVAL, ECG05: 116 MS
PH UR STRIP: 5 [PH] (ref 5–8)
PLATELET # BLD AUTO: 187 K/UL (ref 150–400)
PMV BLD AUTO: 9.8 FL (ref 8.9–12.9)
POTASSIUM SERPL-SCNC: 3.5 MMOL/L (ref 3.5–5.1)
PROT SERPL-MCNC: 7.3 G/DL (ref 6.4–8.2)
PROT UR STRIP-MCNC: NEGATIVE MG/DL
Q-T INTERVAL, ECG07: 506 MS
QRS DURATION, ECG06: 126 MS
QTC CALCULATION (BEZET), ECG08: 513 MS
RBC # BLD AUTO: 3.54 M/UL (ref 3.8–5.2)
SODIUM SERPL-SCNC: 130 MMOL/L (ref 136–145)
SP GR UR REFRACTOMETRY: 1.01 (ref 1–1.03)
TROPONIN-HIGH SENSITIVITY: 22 NG/L (ref 0–51)
UROBILINOGEN UR QL STRIP.AUTO: 0.1 EU/DL (ref 0.2–1)
VENTRICULAR RATE, ECG03: 62 BPM
WBC # BLD AUTO: 8.9 K/UL (ref 3.6–11)

## 2021-11-19 PROCEDURE — 75810000275 HC EMERGENCY DEPT VISIT NO LEVEL OF CARE

## 2021-11-19 PROCEDURE — 83880 ASSAY OF NATRIURETIC PEPTIDE: CPT

## 2021-11-19 PROCEDURE — 81003 URINALYSIS AUTO W/O SCOPE: CPT

## 2021-11-19 PROCEDURE — 80053 COMPREHEN METABOLIC PANEL: CPT

## 2021-11-19 PROCEDURE — 93005 ELECTROCARDIOGRAM TRACING: CPT

## 2021-11-19 PROCEDURE — 99284 EMERGENCY DEPT VISIT MOD MDM: CPT

## 2021-11-19 PROCEDURE — 51702 INSERT TEMP BLADDER CATH: CPT

## 2021-11-19 PROCEDURE — 84484 ASSAY OF TROPONIN QUANT: CPT

## 2021-11-19 PROCEDURE — 36415 COLL VENOUS BLD VENIPUNCTURE: CPT

## 2021-11-19 PROCEDURE — 71045 X-RAY EXAM CHEST 1 VIEW: CPT

## 2021-11-19 PROCEDURE — 85025 COMPLETE CBC W/AUTO DIFF WBC: CPT

## 2021-11-19 NOTE — ED NOTES
Patient and niece instructed on care of david catheter, including cleaning and emptying.  Both agreed that they would be able to follow direction

## 2021-11-19 NOTE — ED NOTES
Dr Curley Boas requested a david after a straight cath procedure demonstrated 270cc clear yellow urine retained.   Inserted by this nurse without difficulty

## 2021-11-19 NOTE — ED TRIAGE NOTES
Discharged Monday after admission for CHF, \"fluid is building up again\", sob and lower extremity edema, given extra dose of lasix yesterday and today and has been unable to void

## 2021-11-21 NOTE — ED PROVIDER NOTES
EMERGENCY DEPARTMENT HISTORY AND PHYSICAL EXAM      Date: 11/19/2021  Patient Name: Kristan Peres    History of Presenting Illness     Chief Complaint   Patient presents with    Shortness of Breath    Urinary Retention       History Provided By: Patient and patiet's niece    HPI: Kristan Peres, 80 y.o. female with a past medical history significant DM, HTN, CAD, CHF, presents to the ED with cc of urinary retention and SOB. Pt was discharged from the hospital Monday after admission for CHF. Per family member she may have mixed up her medications. In turn, home health was concerned that she had not urinated in days and called PCP today stating she had a full bladder and was sent to the ED. She feels slightly more SOB than she had in the hospital before discharge. She does not use oxygen. Pt was seen by PCP yesterday. Pt denies CP, fevers/chills, NVD, abd pain. She was last tested for COVID on 11/13 and was negative. There are no other complaints, changes, or physical findings at this time. PCP: Imelda Cardenas MD    No current facility-administered medications on file prior to encounter. Current Outpatient Medications on File Prior to Encounter   Medication Sig Dispense Refill    furosemide (Lasix) 40 mg tablet Take 1 Tablet by mouth daily. 30 Tablet 2    hydrALAZINE (APRESOLINE) 100 mg tablet Take 1 Tablet by mouth three (3) times daily. 90 Tablet 2    carvediloL (COREG) 25 mg tablet Take 1 Tablet by mouth two (2) times daily (with meals). 60 Tablet 2    cloNIDine HCL (CATAPRES) 0.1 mg tablet Take 1 Tablet by mouth two (2) times a day. 60 Tablet 2    levothyroxine (SYNTHROID) 50 mcg tablet Take 2 Tabs by mouth Daily (before breakfast). 180 Tab 4    liothyronine (CYTOMEL) 5 mcg tablet Take 1 Tab by mouth daily. 90 Tab 4    SITagliptin (JANUVIA) 100 mg tablet Take 100 mg by mouth daily.  clopidogrel (PLAVIX) 75 mg tablet Take  by mouth daily.       CYANOCOBALAMIN, VITAMIN B-12, (VITAMIN B-12 PO) Take  by mouth. Past History     Past Medical History:  Past Medical History:   Diagnosis Date    CAD (coronary artery disease)     [facility]     Diabetes [facility]     Essential hypertension     Gastroesophageal reflux disease     Heart failure (HCC)     Hyperlipidemia     Hypothyroid     Myocardial infarction ([facility]     Osteoarthritis     Peptic ulcer disease     Tobacco use     Vitamin B12 deficiency        Past Surgical History:  Past Surgical History:   Procedure Laterality Date    HX ANGIOPLASTY      HX BACK SURGERY      HX CORONARY STENT PLACEMENT      HX HEART CATHETERIZATION      HX HYSTERECTOMY      HX KNEE ARTHROSCOPY      HX SHOULDER ARTHROSCOPY         Family History:  Family History   Problem Relation Age of Onset    Heart Attack Mother     Hypertension Mother     Diabetes Mother     Colon Cancer Father     Heart Surgery Father        Social History:  Social History     Tobacco Use    Smoking status: Current Every Day Smoker    Smokeless tobacco: Never Used   Substance Use Topics    Alcohol use: No    Drug use: No       Allergies: Allergies   Allergen Reactions    Aspirin Other (comments)    Codeine Other (comments)    Morphine Other (comments)    Pcn [Penicillins] Other (comments)         Review of Systems     Review of Systems   Constitutional: Negative. HENT: Negative. Respiratory: Positive for shortness of breath. Cardiovascular: Negative. Gastrointestinal: Negative. Genitourinary: Positive for difficulty urinating. Musculoskeletal: Negative. Skin: Negative. Neurological: Negative. Physical Exam     Physical Exam  Constitutional:       General: She is not in acute distress. Cardiovascular:      Rate and Rhythm: Normal rate and regular rhythm. Pulmonary:      Effort: Tachypnea present. No accessory muscle usage or respiratory distress.       Breath sounds: Examination of the right-lower field reveals rales. Examination of the left-lower field reveals rales. Rales present. Comments: Pt is breathy when speaking in full sentences  Pulse ox 95-96% at rest  occ rale bases but airmovement appreciated throughout  Abdominal:      Comments: No suprapubic fullness or ttp   Musculoskeletal:         General: Normal range of motion. Cervical back: Normal range of motion. Skin:     General: Skin is warm and dry. Neurological:      General: No focal deficit present. Mental Status: She is alert. Lab and Diagnostic Study Results     Labs -  New Results - Labs    Updated   Order    11/19/21 1559  URINALYSIS W/ RFLX MICROSCOPIC   Collected: 11/19/21 1530  Final result  Specimen: Urine     Color Yellow      Appearance Clear     Specific gravity 1.015     pH (UA) 5.0     Protein Negative mg/dL   Glucose Negative mg/dL   Ketone Negative mg/dL   Bilirubin Negative     Blood Negative     Urobilinogen 0.1 Low  EU/dL   Nitrites Negative     Leukocyte Esterase Negative            11/19/21 1442  NT-PRO BNP   Collected: 11/19/21 1320  Final result  Specimen: Serum or Plasma     NT pro-BNP 7,668 High  pg/mL           11/19/21 1415  TROPONIN-HIGH SENSITIVITY   Collected: 11/19/21 1320  Final result  Specimen: Plasma     Troponin-High Sensitivity 22 ng/L           54/57/90 8197  METABOLIC PANEL, COMPREHENSIVE   Collected: 11/19/21 1320  Final result  Specimen: Serum or Plasma     Sodium 130 Low  mmol/L   Potassium 3.5 mmol/L   Chloride 94 Low  mmol/L   CO2 26 mmol/L   Anion gap 10 mmol/L   Glucose 137 High  mg/dL   BUN 24 High  mg/dL   Creatinine 1.47 High  mg/dL   BUN/Creatinine ratio 16     GFR est AA 42 Low  ml/min/1.73m2   GFR est non-AA 34 Low  ml/min/1.73m2   Calcium 8.5 mg/dL   Bilirubin, total 0.5 mg/dL   AST (SGOT) 22 U/L   ALT (SGPT) 30 U/L   Alk.  phosphatase 100 U/L   Protein, total 7.3 g/dL   Albumin 3.2 Low  g/dL   Globulin 4.1 High  g/dL   A-G Ratio 0.8 Low             11/19/21 1336  CBC WITH AUTOMATED DIFF   Collected: 11/19/21 1320  Final result  Specimen: Whole Blood     WBC 8.9 K/uL   RBC 3.54 Low  M/uL   HGB 10.2 Low  g/dL   HCT 31.4 Low  %   MCV 88.7 FL   MCH 28.8 PG   MCHC 32.5 g/dL   RDW 16.5 High  %   PLATELET 285 K/uL   MPV 9.8 FL   NEUTROPHILS 79 High  %   LYMPHOCYTES 10 Low  %   MONOCYTES 11 %   EOSINOPHILS 0 %   BASOPHILS 0 %   IMMATURE GRANULOCYTES 0 %   ABS. NEUTROPHILS 7.0 K/UL   ABS. LYMPHOCYTES 0.9 K/UL   ABS. MONOCYTES 1.0 K/UL   ABS. EOSINOPHILS 0.0 K/UL   ABS. BASOPHILS 0.0 K/UL   ABS. IMM. GRANS. 0.0 K/UL   DF AUTOMATED                Medical Decision Making   - I am the first provider for this patient. - I reviewed the vital signs, available nursing notes, past medical history, past surgical history, family history and social history. - Initial assessment performed. The patients presenting problems have been discussed, and they are in agreement with the care plan formulated and outlined with them. I have encouraged them to ask questions as they arise throughout their visit. Vital Signs-Reviewed the patient's vital signs. Vitals    Recent BP Temp Pulse (Heart Rate) Resp Rate O2 Sat (%)   11/19 1703 144/66 Important   69  95 %   11/19 1236 145/56 Important  97.9 °F (36.6 °C) 64 26 96 %            Initial            Records Reviewed: Nursing Notes and Old Medical Records   I personally reviewed pt's admission note from 11/15 for CHF. Provider Notes (Medical Decision Making):     MDM       The patient presents with SOB and urinary retention with a differential diagnosis of CHF, volume overload, PNA, anemia, for later and UTI, constipation, retention for former. Labs for anemia, leukocytosis, CHF, UA will be obtained along with EKG and CXR. Will place bladder scanner to determine actual retention as bladder does not appear full. Possible decreased PO intake as pt aware of not drinking as much due to recommendation of her heart doctor to only drink one liter. If significant retention may need david. WIll ambulate patient to determine oxygen level with activity. Possible deconditioning from recent admission and recovery from CHF exacerbation. Initial bladder scan 90 ccs when straight cath performed for UA. Later scan 270 and david placed in consultation with Dr Fredy Can. ED Course:     ED Course as of 11/21/21 1744   Fri Nov 19, 2021   1624 CONSULT NOTE:   4:24 PM  Dr Constance Rushing spoke with Dr Chau Bhatt,   Specialty:   Discussed pt's hx, disposition, and available diagnostic and imaging results. Reviewed care plans. [LG]   1628 Dr Fredy Can was comfortable with patient going home with david. David will be placed. Her pulse ox was consistent with her visit to his office yesterday: at rest, she was 95-96% and walking dropped to 92% for us (91% for him). [LG]      ED Course User Index  [LG] Sravan Bell MD     Progress note: Reviewed all labs and imaging with patient and niece. They are comfortable with the plan and endorse understanding of return precautions. Dr Fredy Can will see patient Monday. Procedures   Medical Decision Makingedical Decision Making  Performed by: Adarsh Du MD  PROCEDURES:  Procedures       Disposition   Disposition: Condition stable    Discharged    DISCHARGE PLAN:  1. Cannot display discharge medications since this patient is not currently admitted. 2.   Follow-up Information     Follow up With Specialties Details Why Adalberto Guzman MD Family Medicine  wants  to see you Monday morning. [address] Emergency Medicine  As needed, If symptoms worsen [address] [place] [id number] [phone]        3. Return to ED if worse   4. Discharge Medication List as of 11/19/2021  5:08 PM            Diagnosis     Clinical Impression:   1. Urinary retention    2. SOB (shortness of breath)    3.  David catheter present Attestations:    Meryle Bale, MD    Please note that this dictation was completed with VocoMD, the computer voice recognition software. Quite often unanticipated grammatical, syntax, homophones, and other interpretive errors are inadvertently transcribed by the computer software. Please disregard these errors. Please excuse any errors that have escaped final proofreading. Thank you.

## 2022-03-20 PROBLEM — I11.0 HEART FAILURE DUE TO HIGH BLOOD PRESSURE (HCC): Status: ACTIVE | Noted: 2021-11-12

## 2022-04-20 ENCOUNTER — TRANSCRIBE ORDER (OUTPATIENT)
Dept: SCHEDULING | Age: 82
End: 2022-04-20

## 2022-04-20 DIAGNOSIS — R09.89 CAROTID BRUIT: Primary | ICD-10-CM

## 2022-04-20 DIAGNOSIS — I63.9 RIGHT HEMISPHERE, CEREBRAL INFARCTION (HCC): ICD-10-CM

## 2022-04-26 ENCOUNTER — HOSPITAL ENCOUNTER (OUTPATIENT)
Dept: NON INVASIVE DIAGNOSTICS | Age: 82
Discharge: HOME | End: 2022-04-26
Attending: FAMILY MEDICINE
Payer: MEDICARE

## 2022-04-26 DIAGNOSIS — R09.89 CAROTID BRUIT: ICD-10-CM

## 2022-04-26 LAB
LEFT ARM BP: 162 MMHG
LEFT CCA DIST DIAS: 6.1 CM/S
LEFT CCA DIST SYS: 107 CM/S
LEFT CCA MID DIAS: 0 CM/S
LEFT CCA MID SYS: 112 CM/S
LEFT CCA PROX DIAS: 0 CM/S
LEFT CCA PROX SYS: 112 CM/S
LEFT ECA DIAS: 0 CM/S
LEFT ECA SYS: 92.4 CM/S
LEFT ICA DIST DIAS: 5.7 CM/S
LEFT ICA DIST SYS: 50.9 CM/S
LEFT ICA PROX DIAS: 10.5 CM/S
LEFT ICA PROX SYS: 110 CM/S
LEFT ICA/CCA SYS: 1.03
LEFT VERTEBRAL DIAS: 0 CM/S
LEFT VERTEBRAL SYS: 56.4 CM/S
RIGHT ARM BP: 156 MMHG
RIGHT CCA DIST DIAS: 0 CM/S
RIGHT CCA DIST SYS: 75.6 CM/S
RIGHT CCA MID DIAS: 5.16 CM/S
RIGHT CCA MID SYS: 138 CM/S
RIGHT CCA PROX DIAS: 0 CM/S
RIGHT CCA PROX SYS: 156 CM/S
RIGHT ECA DIAS: 1.37 CM/S
RIGHT ECA SYS: 116 CM/S
RIGHT ICA DIST DIAS: 10.8 CM/S
RIGHT ICA DIST SYS: 90.2 CM/S
RIGHT ICA PROX DIAS: 8.3 CM/S
RIGHT ICA PROX SYS: 122 CM/S
RIGHT ICA/CCA SYS: 1.61
RIGHT VERTEBRAL DIAS: 0 CM/S
RIGHT VERTEBRAL SYS: 87.8 CM/S
VAS LEFT SUBCLAVIAN PROX EDV: 0 CM/S
VAS LEFT SUBCLAVIAN PROX PSV: 210 CM/S
VAS RIGHT SUBCLAVIAN PROX EDV: 0 CM/S
VAS RIGHT SUBCLAVIAN PROX PSV: 123 CM/S

## 2022-04-26 PROCEDURE — 93880 EXTRACRANIAL BILAT STUDY: CPT

## 2022-05-12 ENCOUNTER — HOSPITAL ENCOUNTER (OUTPATIENT)
Dept: MRI IMAGING | Age: 82
Discharge: HOME | End: 2022-05-12
Attending: FAMILY MEDICINE
Payer: MEDICARE

## 2022-05-12 DIAGNOSIS — I63.9 RIGHT HEMISPHERE, CEREBRAL INFARCTION (HCC): ICD-10-CM

## 2022-05-12 PROCEDURE — 74011250636 HC RX REV CODE- 250/636: Performed by: FAMILY MEDICINE

## 2022-05-12 PROCEDURE — A9577 INJ MULTIHANCE: HCPCS | Performed by: FAMILY MEDICINE

## 2022-05-12 PROCEDURE — 70553 MRI BRAIN STEM W/O & W/DYE: CPT

## 2022-05-12 RX ADMIN — GADOBENATE DIMEGLUMINE 10 ML: 529 INJECTION, SOLUTION INTRAVENOUS at 14:01

## 2023-05-26 RX ORDER — CLONIDINE HYDROCHLORIDE 0.1 MG/1
0.1 TABLET ORAL 2 TIMES DAILY
COMMUNITY
Start: 2021-11-15

## 2023-05-26 RX ORDER — CLOPIDOGREL BISULFATE 75 MG/1
TABLET ORAL DAILY
COMMUNITY

## 2023-05-26 RX ORDER — LIOTHYRONINE SODIUM 5 UG/1
5 TABLET ORAL DAILY
COMMUNITY
Start: 2019-07-02

## 2023-05-26 RX ORDER — HYDRALAZINE HYDROCHLORIDE 100 MG/1
100 TABLET, FILM COATED ORAL 3 TIMES DAILY
COMMUNITY
Start: 2021-11-15

## 2023-05-26 RX ORDER — FUROSEMIDE 40 MG/1
40 TABLET ORAL DAILY
COMMUNITY
Start: 2021-11-15

## 2023-05-26 RX ORDER — CARVEDILOL 25 MG/1
25 TABLET ORAL 2 TIMES DAILY WITH MEALS
COMMUNITY
Start: 2021-11-15

## 2023-05-26 RX ORDER — LEVOTHYROXINE SODIUM 0.05 MG/1
100 TABLET ORAL
COMMUNITY
Start: 2019-07-02

## 2023-08-07 ENCOUNTER — APPOINTMENT (OUTPATIENT)
Facility: HOSPITAL | Age: 83
DRG: 189 | End: 2023-08-07
Payer: MEDICARE

## 2023-08-07 ENCOUNTER — HOSPITAL ENCOUNTER (INPATIENT)
Facility: HOSPITAL | Age: 83
LOS: 2 days | Discharge: LEFT BEFORE DISCHARGE | DRG: 189 | End: 2023-08-09
Attending: EMERGENCY MEDICINE | Admitting: FAMILY MEDICINE
Payer: MEDICARE

## 2023-08-07 DIAGNOSIS — R53.1 GENERALIZED WEAKNESS: Primary | ICD-10-CM

## 2023-08-07 DIAGNOSIS — N18.9 CHRONIC KIDNEY DISEASE, UNSPECIFIED CKD STAGE: ICD-10-CM

## 2023-08-07 DIAGNOSIS — R04.2 HEMOPTYSIS: ICD-10-CM

## 2023-08-07 PROBLEM — K92.2 UPPER GI BLEED: Status: ACTIVE | Noted: 2023-08-07

## 2023-08-07 LAB
ALBUMIN SERPL-MCNC: 3.6 G/DL (ref 3.5–5)
ALBUMIN/GLOB SERPL: 0.8 (ref 1.1–2.2)
ALP SERPL-CCNC: 160 U/L (ref 45–117)
ALT SERPL-CCNC: 17 U/L (ref 12–78)
ANION GAP SERPL CALC-SCNC: 8 MMOL/L (ref 5–15)
ARTERIAL PATENCY WRIST A: YES
AST SERPL W P-5'-P-CCNC: 19 U/L (ref 15–37)
BASE EXCESS BLDA CALC-SCNC: 1.8 MMOL/L (ref 0–3)
BASOPHILS # BLD: 0 K/UL (ref 0–0.1)
BASOPHILS NFR BLD: 0 % (ref 0–1)
BDY SITE: ABNORMAL
BILIRUB SERPL-MCNC: 0.5 MG/DL (ref 0.2–1)
BODY TEMPERATURE: 98
BUN SERPL-MCNC: 30 MG/DL (ref 6–20)
BUN/CREAT SERPL: 16 (ref 12–20)
CA-I BLD-MCNC: 9.1 MG/DL (ref 8.5–10.1)
CHLORIDE SERPL-SCNC: 100 MMOL/L (ref 97–108)
CO2 SERPL-SCNC: 31 MMOL/L (ref 21–32)
COHGB MFR BLD: 1.1 % (ref 1–2)
CREAT SERPL-MCNC: 1.84 MG/DL (ref 0.55–1.02)
DIFFERENTIAL METHOD BLD: ABNORMAL
EOSINOPHIL # BLD: 0 K/UL (ref 0–0.4)
EOSINOPHIL NFR BLD: 0 % (ref 0–7)
ERYTHROCYTE [DISTWIDTH] IN BLOOD BY AUTOMATED COUNT: 14.1 % (ref 11.5–14.5)
FIO2 ON VENT: 40 %
GAS FLOW.O2 O2 DELIVERY SYS: 5 L/MIN
GLOBULIN SER CALC-MCNC: 4.7 G/DL (ref 2–4)
GLUCOSE SERPL-MCNC: 125 MG/DL (ref 65–100)
HCO3 BLDA-SCNC: 26 MMOL/L (ref 22–26)
HCT VFR BLD AUTO: 38.9 % (ref 35–47)
HGB BLD-MCNC: 12.6 G/DL (ref 11.5–16)
IMM GRANULOCYTES # BLD AUTO: 0 K/UL (ref 0–0.04)
IMM GRANULOCYTES NFR BLD AUTO: 0 % (ref 0–0.5)
INR PPP: 1.2 (ref 0.9–1.1)
LYMPHOCYTES # BLD: 0.8 K/UL (ref 0.8–3.5)
LYMPHOCYTES NFR BLD: 13 % (ref 12–49)
MCH RBC QN AUTO: 29 PG (ref 26–34)
MCHC RBC AUTO-ENTMCNC: 32.4 G/DL (ref 30–36.5)
MCV RBC AUTO: 89.4 FL (ref 80–99)
METHGB MFR BLD: 0.5 % (ref 0–1.4)
MONOCYTES # BLD: 0.7 K/UL (ref 0–1)
MONOCYTES NFR BLD: 11 % (ref 5–13)
NEUTS SEG # BLD: 4.6 K/UL (ref 1.8–8)
NEUTS SEG NFR BLD: 76 % (ref 32–75)
OXYHGB MFR BLD: 81 % (ref 95–99)
PCO2 BLDA: 38 MMHG (ref 35–45)
PERFORMED BY:: ABNORMAL
PH BLDA: 7.45 (ref 7.35–7.45)
PLATELET # BLD AUTO: 213 K/UL (ref 150–400)
PMV BLD AUTO: 9.4 FL (ref 8.9–12.9)
PO2 BLDA: 43 MMHG (ref 80–100)
POTASSIUM SERPL-SCNC: 4.5 MMOL/L (ref 3.5–5.1)
PROT SERPL-MCNC: 8.3 G/DL (ref 6.4–8.2)
PROTHROMBIN TIME: 11.3 SEC (ref 9–11.1)
RBC # BLD AUTO: 4.35 M/UL (ref 3.8–5.2)
SAO2 % BLD: 82 % (ref 95–99)
SAO2% DEVICE SAO2% SENSOR NAME: ABNORMAL
SERVICE CMNT-IMP: ABNORMAL
SODIUM SERPL-SCNC: 139 MMOL/L (ref 136–145)
SPECIMEN SITE: ABNORMAL
TROPONIN I SERPL HS-MCNC: 22 NG/L (ref 0–51)
WBC # BLD AUTO: 6 K/UL (ref 3.6–11)

## 2023-08-07 PROCEDURE — C9113 INJ PANTOPRAZOLE SODIUM, VIA: HCPCS | Performed by: STUDENT IN AN ORGANIZED HEALTH CARE EDUCATION/TRAINING PROGRAM

## 2023-08-07 PROCEDURE — 1100000000 HC RM PRIVATE

## 2023-08-07 PROCEDURE — 71045 X-RAY EXAM CHEST 1 VIEW: CPT

## 2023-08-07 PROCEDURE — 2580000003 HC RX 258: Performed by: STUDENT IN AN ORGANIZED HEALTH CARE EDUCATION/TRAINING PROGRAM

## 2023-08-07 PROCEDURE — 71250 CT THORAX DX C-: CPT

## 2023-08-07 PROCEDURE — 6360000002 HC RX W HCPCS: Performed by: STUDENT IN AN ORGANIZED HEALTH CARE EDUCATION/TRAINING PROGRAM

## 2023-08-07 PROCEDURE — 80053 COMPREHEN METABOLIC PANEL: CPT

## 2023-08-07 PROCEDURE — 6370000000 HC RX 637 (ALT 250 FOR IP): Performed by: FAMILY MEDICINE

## 2023-08-07 PROCEDURE — 82803 BLOOD GASES ANY COMBINATION: CPT

## 2023-08-07 PROCEDURE — 85610 PROTHROMBIN TIME: CPT

## 2023-08-07 PROCEDURE — 2700000000 HC OXYGEN THERAPY PER DAY

## 2023-08-07 PROCEDURE — 84484 ASSAY OF TROPONIN QUANT: CPT

## 2023-08-07 PROCEDURE — 6360000002 HC RX W HCPCS: Performed by: FAMILY MEDICINE

## 2023-08-07 PROCEDURE — 99285 EMERGENCY DEPT VISIT HI MDM: CPT

## 2023-08-07 PROCEDURE — 36600 WITHDRAWAL OF ARTERIAL BLOOD: CPT

## 2023-08-07 PROCEDURE — 2500000003 HC RX 250 WO HCPCS: Performed by: FAMILY MEDICINE

## 2023-08-07 PROCEDURE — 85025 COMPLETE CBC W/AUTO DIFF WBC: CPT

## 2023-08-07 PROCEDURE — 2580000003 HC RX 258: Performed by: FAMILY MEDICINE

## 2023-08-07 PROCEDURE — 94640 AIRWAY INHALATION TREATMENT: CPT

## 2023-08-07 PROCEDURE — 93005 ELECTROCARDIOGRAM TRACING: CPT | Performed by: EMERGENCY MEDICINE

## 2023-08-07 RX ORDER — MAGNESIUM SULFATE IN WATER 40 MG/ML
2000 INJECTION, SOLUTION INTRAVENOUS ONCE
Status: COMPLETED | OUTPATIENT
Start: 2023-08-07 | End: 2023-08-08

## 2023-08-07 RX ORDER — IPRATROPIUM BROMIDE AND ALBUTEROL SULFATE 2.5; .5 MG/3ML; MG/3ML
1 SOLUTION RESPIRATORY (INHALATION)
Status: COMPLETED | OUTPATIENT
Start: 2023-08-07 | End: 2023-08-07

## 2023-08-07 RX ORDER — DEXTROSE MONOHYDRATE 100 MG/ML
INJECTION, SOLUTION INTRAVENOUS CONTINUOUS PRN
Status: DISCONTINUED | OUTPATIENT
Start: 2023-08-07 | End: 2023-08-09 | Stop reason: HOSPADM

## 2023-08-07 RX ORDER — INSULIN LISPRO 100 [IU]/ML
0-16 INJECTION, SOLUTION INTRAVENOUS; SUBCUTANEOUS
Status: DISCONTINUED | OUTPATIENT
Start: 2023-08-08 | End: 2023-08-09 | Stop reason: HOSPADM

## 2023-08-07 RX ORDER — LEVOFLOXACIN 5 MG/ML
500 INJECTION, SOLUTION INTRAVENOUS
Status: DISCONTINUED | OUTPATIENT
Start: 2023-08-08 | End: 2023-08-09 | Stop reason: HOSPADM

## 2023-08-07 RX ORDER — CLONIDINE HYDROCHLORIDE 0.1 MG/1
0.1 TABLET ORAL 2 TIMES DAILY
Status: DISCONTINUED | OUTPATIENT
Start: 2023-08-07 | End: 2023-08-08

## 2023-08-07 RX ORDER — ONDANSETRON 2 MG/ML
4 INJECTION INTRAMUSCULAR; INTRAVENOUS EVERY 6 HOURS PRN
Status: DISCONTINUED | OUTPATIENT
Start: 2023-08-07 | End: 2023-08-09 | Stop reason: HOSPADM

## 2023-08-07 RX ORDER — SODIUM CHLORIDE 9 MG/ML
INJECTION, SOLUTION INTRAVENOUS PRN
Status: DISCONTINUED | OUTPATIENT
Start: 2023-08-07 | End: 2023-08-09 | Stop reason: HOSPADM

## 2023-08-07 RX ORDER — SODIUM CHLORIDE 0.9 % (FLUSH) 0.9 %
5-40 SYRINGE (ML) INJECTION PRN
Status: DISCONTINUED | OUTPATIENT
Start: 2023-08-07 | End: 2023-08-09 | Stop reason: HOSPADM

## 2023-08-07 RX ORDER — SODIUM CHLORIDE 0.9 % (FLUSH) 0.9 %
5-40 SYRINGE (ML) INJECTION EVERY 12 HOURS SCHEDULED
Status: DISCONTINUED | OUTPATIENT
Start: 2023-08-07 | End: 2023-08-09 | Stop reason: HOSPADM

## 2023-08-07 RX ORDER — FUROSEMIDE 40 MG/1
40 TABLET ORAL DAILY
Status: DISCONTINUED | OUTPATIENT
Start: 2023-08-07 | End: 2023-08-09 | Stop reason: HOSPADM

## 2023-08-07 RX ORDER — ACETAMINOPHEN 325 MG/1
650 TABLET ORAL EVERY 6 HOURS PRN
Status: DISCONTINUED | OUTPATIENT
Start: 2023-08-07 | End: 2023-08-09 | Stop reason: HOSPADM

## 2023-08-07 RX ORDER — LIOTHYRONINE SODIUM 5 UG/1
5 TABLET ORAL DAILY
Status: DISCONTINUED | OUTPATIENT
Start: 2023-08-08 | End: 2023-08-09 | Stop reason: HOSPADM

## 2023-08-07 RX ORDER — IPRATROPIUM BROMIDE AND ALBUTEROL SULFATE 2.5; .5 MG/3ML; MG/3ML
1 SOLUTION RESPIRATORY (INHALATION) 4 TIMES DAILY
Status: DISCONTINUED | OUTPATIENT
Start: 2023-08-07 | End: 2023-08-08

## 2023-08-07 RX ORDER — POLYETHYLENE GLYCOL 3350 17 G/17G
17 POWDER, FOR SOLUTION ORAL DAILY PRN
Status: DISCONTINUED | OUTPATIENT
Start: 2023-08-07 | End: 2023-08-09 | Stop reason: HOSPADM

## 2023-08-07 RX ORDER — ONDANSETRON 4 MG/1
4 TABLET, ORALLY DISINTEGRATING ORAL EVERY 8 HOURS PRN
Status: DISCONTINUED | OUTPATIENT
Start: 2023-08-07 | End: 2023-08-09 | Stop reason: HOSPADM

## 2023-08-07 RX ORDER — HYDRALAZINE HYDROCHLORIDE 50 MG/1
100 TABLET, FILM COATED ORAL 3 TIMES DAILY
Status: DISCONTINUED | OUTPATIENT
Start: 2023-08-07 | End: 2023-08-09 | Stop reason: HOSPADM

## 2023-08-07 RX ORDER — CARVEDILOL 12.5 MG/1
25 TABLET ORAL 2 TIMES DAILY WITH MEALS
Status: DISCONTINUED | OUTPATIENT
Start: 2023-08-07 | End: 2023-08-08

## 2023-08-07 RX ORDER — IPRATROPIUM BROMIDE AND ALBUTEROL SULFATE 2.5; .5 MG/3ML; MG/3ML
1 SOLUTION RESPIRATORY (INHALATION)
Status: DISCONTINUED | OUTPATIENT
Start: 2023-08-08 | End: 2023-08-08

## 2023-08-07 RX ORDER — ACETAMINOPHEN 650 MG/1
650 SUPPOSITORY RECTAL EVERY 6 HOURS PRN
Status: DISCONTINUED | OUTPATIENT
Start: 2023-08-07 | End: 2023-08-09 | Stop reason: HOSPADM

## 2023-08-07 RX ORDER — INSULIN LISPRO 100 [IU]/ML
0-4 INJECTION, SOLUTION INTRAVENOUS; SUBCUTANEOUS NIGHTLY
Status: DISCONTINUED | OUTPATIENT
Start: 2023-08-07 | End: 2023-08-09 | Stop reason: HOSPADM

## 2023-08-07 RX ORDER — LEVOFLOXACIN 5 MG/ML
750 INJECTION, SOLUTION INTRAVENOUS ONCE
Status: COMPLETED | OUTPATIENT
Start: 2023-08-07 | End: 2023-08-07

## 2023-08-07 RX ADMIN — LEVOFLOXACIN 750 MG: 5 INJECTION, SOLUTION INTRAVENOUS at 21:49

## 2023-08-07 RX ADMIN — SODIUM CHLORIDE, PRESERVATIVE FREE 20 MG: 5 INJECTION INTRAVENOUS at 21:46

## 2023-08-07 RX ADMIN — MAGNESIUM SULFATE HEPTAHYDRATE 2000 MG: 40 INJECTION, SOLUTION INTRAVENOUS at 23:36

## 2023-08-07 RX ADMIN — FUROSEMIDE 40 MG: 40 TABLET ORAL at 21:43

## 2023-08-07 RX ADMIN — HYDRALAZINE HYDROCHLORIDE 100 MG: 50 TABLET, FILM COATED ORAL at 21:43

## 2023-08-07 RX ADMIN — SODIUM CHLORIDE, PRESERVATIVE FREE 80 MG: 5 INJECTION INTRAVENOUS at 18:41

## 2023-08-07 RX ADMIN — IPRATROPIUM BROMIDE AND ALBUTEROL SULFATE 1 DOSE: 2.5; .5 SOLUTION RESPIRATORY (INHALATION) at 22:53

## 2023-08-07 RX ADMIN — METHYLPREDNISOLONE SODIUM SUCCINATE 40 MG: 40 INJECTION, POWDER, FOR SOLUTION INTRAMUSCULAR; INTRAVENOUS at 21:45

## 2023-08-07 ASSESSMENT — PAIN SCALES - GENERAL: PAINLEVEL_OUTOF10: 0

## 2023-08-07 ASSESSMENT — PAIN - FUNCTIONAL ASSESSMENT
PAIN_FUNCTIONAL_ASSESSMENT: 0-10
PAIN_FUNCTIONAL_ASSESSMENT: NONE - DENIES PAIN

## 2023-08-07 NOTE — ED TRIAGE NOTES
Pt c/o coughing up \"large amounts\" of blood and \"extreme weakness. \" Since yesterday. Also states SOB while resting. Pt on Plavix.

## 2023-08-08 ENCOUNTER — APPOINTMENT (OUTPATIENT)
Facility: HOSPITAL | Age: 83
DRG: 189 | End: 2023-08-08
Payer: MEDICARE

## 2023-08-08 LAB
ALBUMIN SERPL-MCNC: 3.3 G/DL (ref 3.5–5)
ALBUMIN/GLOB SERPL: 0.7 (ref 1.1–2.2)
ALP SERPL-CCNC: 140 U/L (ref 45–117)
ALT SERPL-CCNC: 19 U/L (ref 12–78)
ANION GAP SERPL CALC-SCNC: 7 MMOL/L (ref 5–15)
APTT PPP: 28.8 SEC (ref 21.2–34.1)
ARTERIAL PATENCY WRIST A: YES
AST SERPL W P-5'-P-CCNC: 16 U/L (ref 15–37)
BASE EXCESS BLDA CALC-SCNC: 2 MMOL/L (ref 0–3)
BASOPHILS # BLD: 0 K/UL (ref 0–0.1)
BASOPHILS NFR BLD: 0 % (ref 0–1)
BDY SITE: ABNORMAL
BILIRUB SERPL-MCNC: 0.6 MG/DL (ref 0.2–1)
BNP SERPL-MCNC: 8543 PG/ML
BODY TEMPERATURE: 98
BUN SERPL-MCNC: 31 MG/DL (ref 6–20)
BUN/CREAT SERPL: 16 (ref 12–20)
CA-I BLD-MCNC: 8.9 MG/DL (ref 8.5–10.1)
CHLORIDE SERPL-SCNC: 102 MMOL/L (ref 97–108)
CO2 SERPL-SCNC: 26 MMOL/L (ref 21–32)
COHGB MFR BLD: 1.1 % (ref 1–2)
CREAT SERPL-MCNC: 1.92 MG/DL (ref 0.55–1.02)
D DIMER PPP FEU-MCNC: 6.6 UG/ML(FEU)
DIFFERENTIAL METHOD BLD: ABNORMAL
EKG ATRIAL RATE: 74 BPM
EKG DIAGNOSIS: NORMAL
EKG P AXIS: 57 DEGREES
EKG P-R INTERVAL: 118 MS
EKG Q-T INTERVAL: 418 MS
EKG QRS DURATION: 120 MS
EKG QTC CALCULATION (BAZETT): 463 MS
EKG R AXIS: 19 DEGREES
EKG T AXIS: -29 DEGREES
EKG VENTRICULAR RATE: 74 BPM
EOSINOPHIL # BLD: 0 K/UL (ref 0–0.4)
EOSINOPHIL NFR BLD: 0 % (ref 0–7)
ERYTHROCYTE [DISTWIDTH] IN BLOOD BY AUTOMATED COUNT: 14.4 % (ref 11.5–14.5)
ERYTHROCYTE [DISTWIDTH] IN BLOOD BY AUTOMATED COUNT: 14.4 % (ref 11.5–14.5)
EST. AVERAGE GLUCOSE BLD GHB EST-MCNC: 94 MG/DL
FIO2 ON VENT: 28 %
GAS FLOW.O2 O2 DELIVERY SYS: 2 L/MIN
GLOBULIN SER CALC-MCNC: 4.8 G/DL (ref 2–4)
GLUCOSE BLD STRIP.AUTO-MCNC: 149 MG/DL (ref 65–100)
GLUCOSE BLD STRIP.AUTO-MCNC: 161 MG/DL (ref 65–100)
GLUCOSE BLD STRIP.AUTO-MCNC: 162 MG/DL (ref 65–100)
GLUCOSE BLD STRIP.AUTO-MCNC: 177 MG/DL (ref 65–100)
GLUCOSE BLD STRIP.AUTO-MCNC: 273 MG/DL (ref 65–100)
GLUCOSE SERPL-MCNC: 172 MG/DL (ref 65–100)
HBA1C MFR BLD: 4.9 % (ref 4–5.6)
HCO3 BLDA-SCNC: 26 MMOL/L (ref 22–26)
HCT VFR BLD AUTO: 35 % (ref 35–47)
HCT VFR BLD AUTO: 36.5 % (ref 35–47)
HGB BLD-MCNC: 11.5 G/DL (ref 11.5–16)
HGB BLD-MCNC: 11.7 G/DL (ref 11.5–16)
IMM GRANULOCYTES # BLD AUTO: 0 K/UL (ref 0–0.04)
IMM GRANULOCYTES NFR BLD AUTO: 0 % (ref 0–0.5)
INR PPP: 1.3 (ref 0.9–1.1)
LYMPHOCYTES # BLD: 0.4 K/UL (ref 0.8–3.5)
LYMPHOCYTES NFR BLD: 5 % (ref 12–49)
MCH RBC QN AUTO: 28.7 PG (ref 26–34)
MCH RBC QN AUTO: 29 PG (ref 26–34)
MCHC RBC AUTO-ENTMCNC: 32.1 G/DL (ref 30–36.5)
MCHC RBC AUTO-ENTMCNC: 32.9 G/DL (ref 30–36.5)
MCV RBC AUTO: 88.4 FL (ref 80–99)
MCV RBC AUTO: 89.5 FL (ref 80–99)
METHGB MFR BLD: 0.4 % (ref 0–1.4)
MONOCYTES # BLD: 0.2 K/UL (ref 0–1)
MONOCYTES NFR BLD: 2 % (ref 5–13)
MRSA DNA SPEC QL NAA+PROBE: NOT DETECTED
NEUTS SEG # BLD: 8.5 K/UL (ref 1.8–8)
NEUTS SEG NFR BLD: 93 % (ref 32–75)
NRBC # BLD: 0 K/UL (ref 0–0.01)
NRBC # BLD: 0 K/UL (ref 0–0.01)
NRBC BLD-RTO: 0 PER 100 WBC
NRBC BLD-RTO: 0 PER 100 WBC
OXYHGB MFR BLD: 92.4 % (ref 95–99)
PCO2 BLDA: 40 MMHG (ref 35–45)
PERFORMED BY:: ABNORMAL
PH BLDA: 7.44 (ref 7.35–7.45)
PLATELET # BLD AUTO: 211 K/UL (ref 150–400)
PLATELET # BLD AUTO: 224 K/UL (ref 150–400)
PMV BLD AUTO: 9.2 FL (ref 8.9–12.9)
PMV BLD AUTO: 9.5 FL (ref 8.9–12.9)
PO2 BLDA: 66 MMHG (ref 80–100)
POTASSIUM SERPL-SCNC: 4 MMOL/L (ref 3.5–5.1)
PROT SERPL-MCNC: 8.1 G/DL (ref 6.4–8.2)
PROTHROMBIN TIME: 16.2 SEC (ref 11.9–14.6)
RBC # BLD AUTO: 3.96 M/UL (ref 3.8–5.2)
RBC # BLD AUTO: 4.08 M/UL (ref 3.8–5.2)
SAO2 % BLD: 94 % (ref 95–99)
SAO2% DEVICE SAO2% SENSOR NAME: ABNORMAL
SODIUM SERPL-SCNC: 135 MMOL/L (ref 136–145)
SPECIMEN SITE: ABNORMAL
THERAPEUTIC RANGE: NORMAL SEC (ref 82–109)
UFH PPP CHRO-ACNC: <0.1 IU/ML
UFH PPP CHRO-ACNC: <0.1 IU/ML
WBC # BLD AUTO: 5.8 K/UL (ref 3.6–11)
WBC # BLD AUTO: 9.1 K/UL (ref 3.6–11)

## 2023-08-08 PROCEDURE — 85730 THROMBOPLASTIN TIME PARTIAL: CPT

## 2023-08-08 PROCEDURE — 6360000002 HC RX W HCPCS: Performed by: FAMILY MEDICINE

## 2023-08-08 PROCEDURE — 6370000000 HC RX 637 (ALT 250 FOR IP): Performed by: INTERNAL MEDICINE

## 2023-08-08 PROCEDURE — 6370000000 HC RX 637 (ALT 250 FOR IP): Performed by: FAMILY MEDICINE

## 2023-08-08 PROCEDURE — 85520 HEPARIN ASSAY: CPT

## 2023-08-08 PROCEDURE — 3430000000 HC RX DIAGNOSTIC RADIOPHARMACEUTICAL: Performed by: FAMILY MEDICINE

## 2023-08-08 PROCEDURE — 83880 ASSAY OF NATRIURETIC PEPTIDE: CPT

## 2023-08-08 PROCEDURE — 82803 BLOOD GASES ANY COMBINATION: CPT

## 2023-08-08 PROCEDURE — 82962 GLUCOSE BLOOD TEST: CPT

## 2023-08-08 PROCEDURE — 83036 HEMOGLOBIN GLYCOSYLATED A1C: CPT

## 2023-08-08 PROCEDURE — 36415 COLL VENOUS BLD VENIPUNCTURE: CPT

## 2023-08-08 PROCEDURE — 78580 LUNG PERFUSION IMAGING: CPT

## 2023-08-08 PROCEDURE — 2700000000 HC OXYGEN THERAPY PER DAY

## 2023-08-08 PROCEDURE — 85027 COMPLETE CBC AUTOMATED: CPT

## 2023-08-08 PROCEDURE — 2580000003 HC RX 258: Performed by: FAMILY MEDICINE

## 2023-08-08 PROCEDURE — 94640 AIRWAY INHALATION TREATMENT: CPT

## 2023-08-08 PROCEDURE — 85610 PROTHROMBIN TIME: CPT

## 2023-08-08 PROCEDURE — 80053 COMPREHEN METABOLIC PANEL: CPT

## 2023-08-08 PROCEDURE — C9113 INJ PANTOPRAZOLE SODIUM, VIA: HCPCS | Performed by: FAMILY MEDICINE

## 2023-08-08 PROCEDURE — 36600 WITHDRAWAL OF ARTERIAL BLOOD: CPT

## 2023-08-08 PROCEDURE — 94761 N-INVAS EAR/PLS OXIMETRY MLT: CPT

## 2023-08-08 PROCEDURE — 1100000000 HC RM PRIVATE

## 2023-08-08 PROCEDURE — 85379 FIBRIN DEGRADATION QUANT: CPT

## 2023-08-08 PROCEDURE — 87641 MR-STAPH DNA AMP PROBE: CPT

## 2023-08-08 PROCEDURE — A9540 TC99M MAA: HCPCS | Performed by: FAMILY MEDICINE

## 2023-08-08 PROCEDURE — 85025 COMPLETE CBC W/AUTO DIFF WBC: CPT

## 2023-08-08 RX ORDER — IPRATROPIUM BROMIDE AND ALBUTEROL SULFATE 2.5; .5 MG/3ML; MG/3ML
1 SOLUTION RESPIRATORY (INHALATION)
Status: DISCONTINUED | OUTPATIENT
Start: 2023-08-08 | End: 2023-08-09 | Stop reason: HOSPADM

## 2023-08-08 RX ORDER — HEPARIN SODIUM 5000 [USP'U]/ML
5000 INJECTION, SOLUTION INTRAVENOUS; SUBCUTANEOUS
Status: DISCONTINUED | OUTPATIENT
Start: 2023-08-08 | End: 2023-08-08

## 2023-08-08 RX ORDER — FAMOTIDINE 20 MG/1
20 TABLET, FILM COATED ORAL 2 TIMES DAILY
Status: DISCONTINUED | OUTPATIENT
Start: 2023-08-08 | End: 2023-08-09 | Stop reason: HOSPADM

## 2023-08-08 RX ORDER — IPRATROPIUM BROMIDE AND ALBUTEROL SULFATE 2.5; .5 MG/3ML; MG/3ML
1 SOLUTION RESPIRATORY (INHALATION) EVERY 4 HOURS
Status: DISCONTINUED | OUTPATIENT
Start: 2023-08-08 | End: 2023-08-08

## 2023-08-08 RX ORDER — HEPARIN SODIUM 10000 [USP'U]/100ML
5-30 INJECTION, SOLUTION INTRAVENOUS CONTINUOUS
Status: DISCONTINUED | OUTPATIENT
Start: 2023-08-08 | End: 2023-08-08

## 2023-08-08 RX ORDER — LOSARTAN POTASSIUM 50 MG/1
50 TABLET ORAL DAILY
COMMUNITY

## 2023-08-08 RX ORDER — GUAIFENESIN 600 MG/1
600 TABLET, EXTENDED RELEASE ORAL
Status: COMPLETED | OUTPATIENT
Start: 2023-08-08 | End: 2023-08-08

## 2023-08-08 RX ORDER — HEPARIN SODIUM 1000 [USP'U]/ML
80 INJECTION, SOLUTION INTRAVENOUS; SUBCUTANEOUS ONCE
Status: DISCONTINUED | OUTPATIENT
Start: 2023-08-08 | End: 2023-08-09 | Stop reason: HOSPADM

## 2023-08-08 RX ORDER — HEPARIN SODIUM 1000 [USP'U]/ML
40 INJECTION, SOLUTION INTRAVENOUS; SUBCUTANEOUS PRN
Status: DISCONTINUED | OUTPATIENT
Start: 2023-08-08 | End: 2023-08-09 | Stop reason: HOSPADM

## 2023-08-08 RX ORDER — HEPARIN SODIUM 1000 [USP'U]/ML
80 INJECTION, SOLUTION INTRAVENOUS; SUBCUTANEOUS PRN
Status: DISCONTINUED | OUTPATIENT
Start: 2023-08-08 | End: 2023-08-09 | Stop reason: HOSPADM

## 2023-08-08 RX ADMIN — IPRATROPIUM BROMIDE AND ALBUTEROL SULFATE 1 DOSE: 2.5; .5 SOLUTION RESPIRATORY (INHALATION) at 08:26

## 2023-08-08 RX ADMIN — SODIUM CHLORIDE, PRESERVATIVE FREE 10 ML: 5 INJECTION INTRAVENOUS at 04:46

## 2023-08-08 RX ADMIN — SODIUM CHLORIDE, PRESERVATIVE FREE 5 ML: 5 INJECTION INTRAVENOUS at 10:40

## 2023-08-08 RX ADMIN — HYDRALAZINE HYDROCHLORIDE 100 MG: 50 TABLET, FILM COATED ORAL at 09:02

## 2023-08-08 RX ADMIN — LEVOFLOXACIN 500 MG: 5 INJECTION, SOLUTION INTRAVENOUS at 20:30

## 2023-08-08 RX ADMIN — GUAIFENESIN 600 MG: 600 TABLET, EXTENDED RELEASE ORAL at 04:46

## 2023-08-08 RX ADMIN — ONDANSETRON 4 MG: 2 INJECTION INTRAMUSCULAR; INTRAVENOUS at 12:20

## 2023-08-08 RX ADMIN — METHYLPREDNISOLONE SODIUM SUCCINATE 40 MG: 40 INJECTION, POWDER, FOR SOLUTION INTRAMUSCULAR; INTRAVENOUS at 20:59

## 2023-08-08 RX ADMIN — SODIUM CHLORIDE, PRESERVATIVE FREE 80 MG: 5 INJECTION INTRAVENOUS at 09:02

## 2023-08-08 RX ADMIN — IPRATROPIUM BROMIDE AND ALBUTEROL SULFATE 1 DOSE: 2.5; .5 SOLUTION RESPIRATORY (INHALATION) at 12:01

## 2023-08-08 RX ADMIN — LIOTHYRONINE SODIUM 5 MCG: 5 TABLET ORAL at 09:02

## 2023-08-08 RX ADMIN — METHYLPREDNISOLONE SODIUM SUCCINATE 40 MG: 40 INJECTION, POWDER, FOR SOLUTION INTRAMUSCULAR; INTRAVENOUS at 04:45

## 2023-08-08 RX ADMIN — SODIUM CHLORIDE, PRESERVATIVE FREE 10 ML: 5 INJECTION INTRAVENOUS at 20:59

## 2023-08-08 RX ADMIN — METHYLPREDNISOLONE SODIUM SUCCINATE 40 MG: 40 INJECTION, POWDER, FOR SOLUTION INTRAMUSCULAR; INTRAVENOUS at 10:43

## 2023-08-08 RX ADMIN — ACETAMINOPHEN 650 MG: 325 TABLET ORAL at 12:20

## 2023-08-08 RX ADMIN — FAMOTIDINE 20 MG: 20 TABLET, FILM COATED ORAL at 15:40

## 2023-08-08 RX ADMIN — IPRATROPIUM BROMIDE AND ALBUTEROL SULFATE 1 DOSE: 2.5; .5 SOLUTION RESPIRATORY (INHALATION) at 01:43

## 2023-08-08 RX ADMIN — METHYLPREDNISOLONE SODIUM SUCCINATE 40 MG: 40 INJECTION, POWDER, FOR SOLUTION INTRAMUSCULAR; INTRAVENOUS at 16:55

## 2023-08-08 RX ADMIN — LEVOTHYROXINE SODIUM 100 MCG: 0.1 TABLET ORAL at 09:02

## 2023-08-08 RX ADMIN — FAMOTIDINE 20 MG: 20 TABLET, FILM COATED ORAL at 20:57

## 2023-08-08 RX ADMIN — FUROSEMIDE 40 MG: 40 TABLET ORAL at 09:02

## 2023-08-08 RX ADMIN — HEPARIN SODIUM 18 UNITS/KG/HR: 10000 INJECTION, SOLUTION INTRAVENOUS at 06:17

## 2023-08-08 RX ADMIN — IPRATROPIUM BROMIDE AND ALBUTEROL SULFATE 1 DOSE: 2.5; .5 SOLUTION RESPIRATORY (INHALATION) at 19:12

## 2023-08-08 RX ADMIN — HYDRALAZINE HYDROCHLORIDE 100 MG: 50 TABLET, FILM COATED ORAL at 21:02

## 2023-08-08 RX ADMIN — HYDRALAZINE HYDROCHLORIDE 100 MG: 50 TABLET, FILM COATED ORAL at 15:40

## 2023-08-08 RX ADMIN — KIT FOR THE PREPARATION OF TECHNETIUM TC 99M ALBUMIN AGGREGATED 3.9 MILLICURIE: 2.5 INJECTION, POWDER, FOR SOLUTION INTRAVENOUS at 11:00

## 2023-08-08 ASSESSMENT — PAIN SCALES - GENERAL
PAINLEVEL_OUTOF10: 0
PAINLEVEL_OUTOF10: 0
PAINLEVEL_OUTOF10: 2
PAINLEVEL_OUTOF10: 0

## 2023-08-08 ASSESSMENT — PAIN DESCRIPTION - DESCRIPTORS: DESCRIPTORS: ACHING

## 2023-08-08 ASSESSMENT — PAIN DESCRIPTION - LOCATION: LOCATION: HEAD

## 2023-08-08 NOTE — PROCEDURES
Vascular Access Team Consult Note: received consult for PIV placement     Ultrasound-guided (USG) PIV placement: see Epic Avatar and EHR flowsheet date for additional vascular access device and procedural information. 20 g 1.75 inch PIV placed with ultrasound-guidance x 1 attempt in right forearm vein. Brisk blood return noted, flushed easily with 10 mL NS. Dressed with large, 3M dressing with CHG wafer. Needle-free connector and alcohol swab end caps utilized. Client tolerated well, no patient complaints. Client continues to benefit from ultrasound-guided PIV placement due to limited suitable veins for USG cannulation, vein depth, and small vein diameters. Primary care nurse, Bryson Perkins, notified. Please re-enter IP PICC Team Consult in Epic for any further vascular access needs.      Era Sauer RN

## 2023-08-08 NOTE — ED PROVIDER NOTES
[facility] EMERGENCY DEPT  EMERGENCY DEPARTMENT HISTORY AND PHYSICAL EXAM      Date: 8/7/2023  Patient Name: Wisam Sanders  MRN: [id number]  [address] [facility] 1940  Date of evaluation: 8/7/2023  Provider: Fabiano Cuello DO   Note Started: 12:31 AM EDT 8/8/23    HISTORY OF PRESENT ILLNESS     Chief Complaint   Patient presents with    Fatigue    Hemoptysis       History Provided By: Patient    HPI: Wisam Sanders is a 82 y.o. female with history of CAD, hypertension, diabetes and COPD, on 2 L home O2, who was transferred from [facility] emergency department due to questionable hemoptysis versus hematemesis. Patient had large-volume hematemesis this morning prompted the visit to the emergency department. Transferred to [facility] for endoscopy with GI. Patient has not had any further episodes. Denies any fevers, abdominal pain, diarrhea, bloody stools, or any other symptoms or complaints.     PAST MEDICAL HISTORY   Past Medical History:  Past Medical History:   Diagnosis Date    CAD (coronary artery disease)     Chronic obstructive pulmonary disease ([address])     Diabetes ([address])     Diabetes mellitus ([address])     Essential hypertension     Gastroesophageal reflux disease     Heart failure (HCC)     Hyperlipidemia     Hypertension     Hypothyroid     Myocardial infarction ([address])     Osteoarthritis     Peptic ulcer disease     Tobacco use     Vitamin B12 deficiency        Past Surgical History:  Past Surgical History:   Procedure Laterality Date    ANGIOPLASTY      BACK SURGERY      CARDIAC CATHETERIZATION      CORONARY ANGIOPLASTY WITH STENT PLACEMENT      HYSTERECTOMY (CERVIX STATUS UNKNOWN)      KNEE ARTHROSCOPY      SHOULDER ARTHROSCOPY         Family History:  Family History   Problem Relation Age of Onset    Heart Attack Mother     Heart Surgery Father     Colon Cancer Father     Diabetes Mother     Hypertension Mother        Social History:  Social History     Tobacco Use    Smoking status:

## 2023-08-08 NOTE — ED NOTES
Héctor jacob states the abg doesn't look right and will need to redraw it.      Carisa Chowdhury, DELMY  08/08/23 5903

## 2023-08-08 NOTE — H&P
History and Physical    NAME:   Alley Madrid   :  1940   MRN:  [id number]     Date/Time: 2023 8:44 PM    Patient PCP: Moe Olmedo MD  ______________________________________________________________________       Subjective:     CHIEF COMPLAINT:     Hemoptysis      HISTORY OF PRESENT ILLNESS:       Patient is a 82y.o. year old female past medical history of COPD type 2 diabetes hypertension CAD status post CABG on chronic oxygen 2 L at home smokes cigarettes came to emergency room because of coughing up blood  Patient denies any chest pain shortness of breath nausea vomiting diarrhea constipation no fever no chills    Past Medical History:   Diagnosis Date    CAD (coronary artery disease)     Chronic obstructive pulmonary disease ([address])     Diabetes ([address])     Diabetes mellitus ([address])     Essential hypertension     Gastroesophageal reflux disease     Heart failure (HCC)     Hyperlipidemia     Hypertension     Hypothyroid     Myocardial infarction ([address])     Osteoarthritis     Peptic ulcer disease     Tobacco use     Vitamin B12 deficiency         Past Surgical History:   Procedure Laterality Date    ANGIOPLASTY      BACK SURGERY      CARDIAC CATHETERIZATION      CORONARY ANGIOPLASTY WITH STENT PLACEMENT      HYSTERECTOMY (CERVIX STATUS UNKNOWN)      KNEE ARTHROSCOPY      SHOULDER ARTHROSCOPY         Social History     Tobacco Use    Smoking status: Every Day     Packs/day: 0.50     Types: Cigarettes    Smokeless tobacco: Never   Substance Use Topics    Alcohol use: No        Family History   Problem Relation Age of Onset    Heart Attack Mother     Heart Surgery Father     Colon Cancer Father     Diabetes Mother     Hypertension Mother        Allergies   Allergen Reactions    Aspirin Other (See Comments)    Codeine Other (See Comments)    Morphine Other (See Comments)    Penicillins Other (See Comments)        Prior to Admission medications    Medication Sig Start Date End Date Taking?  Authorizing MD    glucose chewable tablet 16 g, 4 tablet, Oral, PRN, Fernando Johnson MD    dextrose bolus 10% 125 mL, 125 mL, IntraVENous, PRN **OR** dextrose bolus 10% 250 mL, 250 mL, IntraVENous, PRN, Fernando Johnson MD    glucagon (rDNA) injection 1 mg, 1 mg, SubCUTAneous, PRN, Fernando Johnson MD    dextrose 10 % infusion, , IntraVENous, Continuous PRN, Fernando Johnson MD    sodium chloride flush 0.9 % injection 5-40 mL, 5-40 mL, IntraVENous, 2 times per day, Lynn Fulton MD    sodium chloride flush 0.9 % injection 5-40 mL, 5-40 mL, IntraVENous, PRN, Fernando Johnson MD    0.9 % sodium chloride infusion, , IntraVENous, PRN, Fernando Johnson MD    ondansetron (ZOFRAN-ODT) disintegrating tablet 4 mg, 4 mg, Oral, Q8H PRN **OR** ondansetron (ZOFRAN) injection 4 mg, 4 mg, IntraVENous, Q6H PRN, Fernando Johnson MD    polyethylene glycol (GLYCOLAX) packet 17 g, 17 g, Oral, Daily PRN, Fernando Johnson MD    acetaminophen (TYLENOL) tablet 650 mg, 650 mg, Oral, Q6H PRN **OR** acetaminophen (TYLENOL) suppository 650 mg, 650 mg, Rectal, Q6H PRN, Fernando Johnson MD    methylPREDNISolone sodium (PF) (SOLU-MEDROL PF) injection 40 mg, 40 mg, IntraVENous, Q6H, Emma Johnson MD    [START ON 8/8/2023] ipratropium 0.5 mg-albuterol 2.5 mg (DUONEB) nebulizer solution 1 Dose, 1 Dose, Inhalation, Q4H WA RT, Emma Johnson MD    levoFLOXacin (LEVAQUIN) 750 MG/150ML infusion 750 mg, 750 mg, IntraVENous, Q24H, Emma Johnson MD    Current Outpatient Medications:     carvedilol (COREG) 25 MG tablet, Take 1 tablet by mouth 2 times daily (with meals), Disp: , Rfl:     cloNIDine (CATAPRES) 0.1 MG tablet, Take 1 tablet by mouth 2 times daily, Disp: , Rfl:     clopidogrel (PLAVIX) 75 MG tablet, Take by mouth daily, Disp: , Rfl:     furosemide (LASIX) 40 MG tablet, Take 1 tablet by mouth daily, Disp: , Rfl:     hydrALAZINE (APRESOLINE) 100 MG tablet, Take 1 tablet by mouth 3 times daily, Disp: , Rfl:     levothyroxine (SYNTHROID) 50 MCG

## 2023-08-08 NOTE — PROGRESS NOTES
Inhalation, Q4H, Jerry Kaiser MD, 1 Dose at 08/08/23 0826    pantoprazole (PROTONIX) 80 mg in sodium chloride (PF) 0.9 % 20 mL injection, 80 mg, IntraVENous, Daily, Emely Johnson MD, 80 mg at 08/08/23 0902    carvedilol (COREG) tablet 25 mg, 25 mg, Oral, BID , Emma Johnson MD    cloNIDine (CATAPRES) tablet 0.1 mg, 0.1 mg, Oral, BID, Emma Johnson MD    furosemide (LASIX) tablet 40 mg, 40 mg, Oral, Daily, Emely Johnson MD, 40 mg at 08/08/23 5547    hydrALAZINE (APRESOLINE) tablet 100 mg, 100 mg, Oral, TID, Tamar Peabody, MD, 100 mg at 08/08/23 0300    levothyroxine (SYNTHROID) tablet 100 mcg, 100 mcg, Oral, QAM , Emma Johnson MD, 100 mcg at 08/08/23 5676    liothyronine (CYTOMEL) tablet 5 mcg, 5 mcg, Oral, Daily, Emma Johnson MD, 5 mcg at 08/08/23 1622    insulin lispro (HUMALOG) injection vial 0-16 Units, 0-16 Units, SubCUTAneous, TID , Emma Johnson MD    insulin lispro (HUMALOG) injection vial 0-4 Units, 0-4 Units, SubCUTAneous, Nightly, Emma Johnson MD    glucose chewable tablet 16 g, 4 tablet, Oral, PRN, Emely Johnson MD    dextrose bolus 10% 125 mL, 125 mL, IntraVENous, PRN **OR** dextrose bolus 10% 250 mL, 250 mL, IntraVENous, PRN, Emely Johnson MD    glucagon (rDNA) injection 1 mg, 1 mg, SubCUTAneous, PRN, Emely Johnson MD    dextrose 10 % infusion, , IntraVENous, Continuous PRN, Emely Johnson MD    sodium chloride flush 0.9 % injection 5-40 mL, 5-40 mL, IntraVENous, 2 times per day, Emely Johnson MD, 5 mL at 08/08/23 1040    sodium chloride flush 0.9 % injection 5-40 mL, 5-40 mL, IntraVENous, PRN, Emely Johnson MD, 10 mL at 08/08/23 0446    0.9 % sodium chloride infusion, , IntraVENous, PRN, Emely Johnson MD    ondansetron (ZOFRAN-ODT) disintegrating tablet 4 mg, 4 mg, Oral, Q8H PRN **OR** ondansetron (ZOFRAN) injection 4 mg, 4 mg, IntraVENous, Q6H PRN, Emely Johnson MD    polyethylene glycol (GLYCOLAX) packet 17 g, 17 g, Oral, Daily PRN, Emely June 2. Possible nodular densities both upper lung zones, consider CT. XR CHEST 1 VIEW    (Results Pending)   NM LUNG SCAN PERFUSION ONLY    (Results Pending)        Review of Systems:  Constitutional: Positive for generalized weakness, fatigue. Negative for chills and fever. HENT: Negative. Eyes: Negative. Respiratory: Negative. Cardiovascular: Negative. Gastrointestinal: Negative for abdominal pain and nausea. Skin: Negative. Neurological: Negative. Objective:   VITALS:    Vitals:    08/08/23 0826   BP:    Pulse: 79   Resp: 16   Temp:    SpO2: 94%       Physical Exam:   Constitutional: pt is oriented to person, place, and time. HENT:   Head: Normocephalic and atraumatic. Eyes: Pupils are equal, round, and reactive to light. EOM are normal.   Cardiovascular: Normal rate, regular rhythm and normal heart sounds. Pulmonary/Chest: Breath sounds normal. No wheezes. No rales. Exhibits no tenderness. Abdominal: Soft. Bowel sounds are normal. There is no abdominal tenderness. There is no rebound and no guarding. Musculoskeletal: Normal range of motion. Neurological: pt is alert and oriented to person, place, and time. Alert. Normal strength. No cranial nerve deficit or sensory deficit. Displays a negative Romberg sign.         ASSESSMENT & PLAN:    Hemoptysis  COPD  Chronic respiratory failure on 2 L at home  Type 2 diabetes  Hypertension  On ongoing smoking  Chronic kidney disease stage IIIb      Telemetry  NPO  GI Consult  VQ scan     Pulmonary consult  IV Solu-Medrol nebulizer  Pepcid  Empiric IV abx (Levaquin currently, f/u culture results)  Lasix  2L NC  Maintain Hgb <7 prn    Monitor routine labs      Current Facility-Administered Medications:     heparin (porcine) injection 3,380 Units, 80 Units/kg, IntraVENous, Once, Emma Johnson MD    heparin (porcine) injection 3,380 Units, 80 Units/kg, IntraVENous, PRN, Emma Johnson MD    heparin (porcine) injection 1,690 Units, 40

## 2023-08-08 NOTE — PROGRESS NOTES
Physician Progress Note      PATIENTClassie Favre  [facility] #:                  [id number]  :                       1940  ADMIT DATE:       2023 5:35 PM  [address] [facility] DATE:  RESPONDING  PROVIDER #:        Nydia Templeton MD          QUERY TEXT:    Patient admitted with hematemesis. Noted documentation of acute on chronic   respiratory failure in note on . In order to support the diagnosis of acute   on chronic respiratory failure, please include additional clinical indicators   in your documentation. Or please document if the diagnosis of acute   respiratory failure has been ruled out after further study. The medical record reflects the following:  Risk Factors: 83 yo female with PMH chronic respiratory failure    Clinical Indicators:  Acute on chronic hypoxic respiratory failure per   Pulmonary consult note  02 sat low 88 on nasal cannula @ 2L  On adm ABG 7.45 38 43 26 1.8 02 sat 82 RR 26    Treatment: ABG in ED, Pulmonary consult, supplemental 02    Acute Respiratory Failure Clinical Indicators per [employer] Training Guide and   Quick Reference Guide:  pO2 < 60 mmHg or SpO2 (pulse oximetry) < 91% breathing room air  pCO2 > 50 and pH < 7.35  P/F ratio (pO2 / FIO2) < 300  pO2 decrease or pCO2 increase by 10 mmHg from baseline (if known)  Supplemental oxygen of 40% or more  Presence of respiratory distress, tachypnea, dyspnea, shortness of breath,   wheezing  Unable to speak in complete sentences  Use of accessory muscles to breathe  Extreme anxiety and feeling of impending doom  Tripod position  Confusion/altered mental status/obtunded      Thank you for your time,    Ignacio GORE, RN, [place]  C: [phone]    [email or web address]  Options provided:  -- Acute Respiratory Failure as evidenced by, Please document evidence.   -- Acute Respiratory Failure ruled out after study and Chronic Respiratory   Failure PM

## 2023-08-08 NOTE — PLAN OF CARE
Problem: Safety - Adult  Goal: Free from fall injury  Outcome: Progressing     Problem: Discharge Planning  Goal: Discharge to home or other facility with appropriate resources  Outcome: Progressing     Problem: Chronic Conditions and Co-morbidities  Goal: Patient's chronic conditions and co-morbidity symptoms are monitored and maintained or improved  Outcome: Progressing     Problem: Respiratory - Adult  Goal: Achieves optimal ventilation and oxygenation  Outcome: Progressing     Problem: Cardiovascular - Adult  Goal: Maintains optimal cardiac output and hemodynamic stability  Outcome: Progressing  Goal: Absence of cardiac dysrhythmias or at baseline  Outcome: Progressing     Problem: Skin/Tissue Integrity - Adult  Goal: Skin integrity remains intact  Outcome: Progressing  Flowsheets  Taken 8/8/2023 0800 by Chelsey Berrios RN  Skin Integrity Remains Intact:   Monitor for areas of redness and/or skin breakdown   Assess vascular access sites hourly   Every 4-6 hours minimum: Change oxygen saturation probe site   Every 4-6 hours: If on nasal continuous positive airway pressure, respiratory therapy assesses nares and determine need for appliance change or resting period  Taken 8/8/2023 0500 by Ayesha Martin RN  Skin Integrity Remains Intact: Monitor for areas of redness and/or skin breakdown     Problem: Musculoskeletal - Adult  Goal: Return mobility to safest level of function  Outcome: Progressing  Goal: Maintain proper alignment of affected body part  Outcome: Progressing  Goal: Return ADL status to a safe level of function  Outcome: Progressing     Problem: Gastrointestinal - Adult  Goal: Minimal or absence of nausea and vomiting  Outcome: Progressing  Goal: Maintains adequate nutritional intake  Outcome: Progressing     Problem: Metabolic/Fluid and Electrolytes - Adult  Goal: Electrolytes maintained within normal limits  Outcome: Progressing  Goal: Hemodynamic stability and optimal renal function

## 2023-08-08 NOTE — PROGRESS NOTES
History and Physical    NAME:   Kelly Tilley   :  1940   MRN:  [id number]     Date/Time: 2023 9:00 AM    Patient PCP: Travis Barnes MD  ______________________________________________________________________       Subjective:     CHIEF COMPLAINT:     Hemoptysis      HISTORY OF PRESENT ILLNESS:       Patient is a 82y.o. year old female past medical history of COPD type 2 diabetes hypertension CAD status post CABG on chronic oxygen 2 L at home smokes cigarettes came to emergency room because of coughing up blood  Patient denies any chest pain shortness of breath nausea vomiting diarrhea constipation no fever no chills        Patient laying comfortably in bed, coughing with deep breaths on auscultation, non-productive at this time. Currently she just feels tired, weak, and hungry because she has not eaten since 11am yesterday. NPO until at least 11am for upper endoscopy. Denies CP, SOB, nausea, vomiting, diarrhea, fever, chills. Additional history: Patient woke up with cough 2 days ago, productive with bright red blood. No sick contacts. Reports she had similar symptoms 30 years ago, which resolved w/o treatment. Diagnosed with COPD 1 year ago. Hgb: 11.5, stable  WBC: 9.1    Chest CT w/o Contrast:  1. Background of centrilobular and paraseptal emphysema. 2.  Focal opacities in the right upper lobe and left apex may represent an  infectious process. Recommend short-term follow-up chest CT in 3 months. 3.  Small right pleural effusion.  Probable loculated left effusion with  bibasilar volume loss      Past Medical History:   Diagnosis Date    CAD (coronary artery disease)     Chronic obstructive pulmonary disease (HCC)     Diabetes ([address])     Diabetes mellitus ([address])     Essential hypertension     Gastroesophageal reflux disease     Heart failure (HCC)     Hyperlipidemia     Hypertension     Hypothyroid     Myocardial infarction ([address])     Osteoarthritis     Peptic ulcer disease     Tobacco

## 2023-08-08 NOTE — PROGRESS NOTES
Heparin Infusion Initiation  Isaiah Toscano is a 82 y.o. female starting heparin for:  PE  Heparin dosing: order for weight based protocol  Initial Dosing Weight: 42.2 kg (Recorded body weight)  Recent Labs     08/07/23  1458 08/08/23  0040    224   HGB 12.6 11.5   INR 1.2*  --      Factor Xa inhibitor/LMWH use within the past 72 hours? No  If yes, date and time of last administration: N/A  Hypertriglyceridemia (> 690 mg/dL) or hyperbilirubinemia (> 37 mg/dL conjugated bilirubin, >14 mg/dL unconjugated bilirubin) present? No  Recent Labs     08/08/23  0040   BILITOT 0.6       Assessment/Plan:   Heparin to be monitored using anti-Xa for duration of therapy  Initial bolus ordered: Yes  Starting rate:  18 unit/kg/hr  PRN boluses entered:  Yes

## 2023-08-08 NOTE — CARE COORDINATION
23 1145   Service Assessment   Patient Orientation Alert and Oriented   Cognition Alert   History Provided By Patient   Primary Maria L Hairston [place] Family Members   Patient's Healthcare Decision Maker is: Legal Next of [facility]   PCP Verified by CM Yes   Last Visit to PCP Within last 3 months  (Seen in 2023)   Prior Functional Level Independent in ADLs/IADLs   Current Functional Level Independent in ADLs/IADLs   Can patient return to prior living arrangement Yes   Ability to make needs known: Good   Family able to assist with home care needs: Yes   Would you like for me to discuss the discharge plan with any other family members/significant others, and if so, who? Yes   Financial Resources Medicare; Other (Comment)  ( for Life)   Community Resources None   Social/Functional History   Lives With Alone   Type of [address] One level   Home Equipment Rollator; [address] Help From Family   ADL Assistance Independent   Ambulation Assistance Independent   Transfer Assistance Independent   Active  No   Occupation Retired   Discharge Planning   Type of [address] Prior To Admission None   Patient expects to be discharged to: House       Patient lying in the bed w/her niece ([place]) at the bedside. Lives alone in a single level home. Last seen at PCP office approximately 1.5 mo ago (2023). Utilizes [facility] and [facility]) for short term scripts. Home O2 2L PRN. DME consists of rollator-used daily and manual wheelchair-PRN/only when grocery shopping. Please note, patient does ambulate w/rollator. Self reports independent w/all ADL's & IADL's. Hasn't driven x 1 year and family provides all transportation needs. Prior HH in the past.  No prior SNF or IRF. Patient voiced \"no kids. Parents/siblings are all . \"  Patient is a FULL code.

## 2023-08-09 ENCOUNTER — APPOINTMENT (OUTPATIENT)
Facility: HOSPITAL | Age: 83
DRG: 189 | End: 2023-08-09
Payer: MEDICARE

## 2023-08-09 VITALS
DIASTOLIC BLOOD PRESSURE: 68 MMHG | BODY MASS INDEX: 17.11 KG/M2 | HEART RATE: 87 BPM | TEMPERATURE: 98.8 F | WEIGHT: 93 LBS | OXYGEN SATURATION: 100 % | SYSTOLIC BLOOD PRESSURE: 139 MMHG | HEIGHT: 62 IN | RESPIRATION RATE: 20 BRPM

## 2023-08-09 LAB
ALBUMIN SERPL-MCNC: 3.4 G/DL (ref 3.5–5)
ALBUMIN/GLOB SERPL: 0.8 (ref 1.1–2.2)
ALP SERPL-CCNC: 129 U/L (ref 45–117)
ALT SERPL-CCNC: 18 U/L (ref 12–78)
ANION GAP SERPL CALC-SCNC: 9 MMOL/L (ref 5–15)
AST SERPL W P-5'-P-CCNC: 12 U/L (ref 15–37)
BASOPHILS # BLD: 0 K/UL (ref 0–0.1)
BASOPHILS NFR BLD: 0 % (ref 0–1)
BILIRUB SERPL-MCNC: 0.4 MG/DL (ref 0.2–1)
BUN SERPL-MCNC: 48 MG/DL (ref 6–20)
BUN/CREAT SERPL: 15 (ref 12–20)
CA-I BLD-MCNC: 8.4 MG/DL (ref 8.5–10.1)
CHLORIDE SERPL-SCNC: 99 MMOL/L (ref 97–108)
CO2 SERPL-SCNC: 25 MMOL/L (ref 21–32)
CREAT SERPL-MCNC: 3.2 MG/DL (ref 0.55–1.02)
DIFFERENTIAL METHOD BLD: ABNORMAL
EOSINOPHIL # BLD: 0 K/UL (ref 0–0.4)
EOSINOPHIL NFR BLD: 0 % (ref 0–7)
ERYTHROCYTE [DISTWIDTH] IN BLOOD BY AUTOMATED COUNT: 15.1 % (ref 11.5–14.5)
GLOBULIN SER CALC-MCNC: 4.4 G/DL (ref 2–4)
GLUCOSE SERPL-MCNC: 116 MG/DL (ref 65–100)
HCT VFR BLD AUTO: 32.2 % (ref 35–47)
HGB BLD-MCNC: 10.5 G/DL (ref 11.5–16)
IMM GRANULOCYTES # BLD AUTO: 0 K/UL (ref 0–0.04)
IMM GRANULOCYTES NFR BLD AUTO: 1 % (ref 0–0.5)
LYMPHOCYTES # BLD: 0.3 K/UL (ref 0.8–3.5)
LYMPHOCYTES NFR BLD: 4 % (ref 12–49)
MCH RBC QN AUTO: 29.1 PG (ref 26–34)
MCHC RBC AUTO-ENTMCNC: 32.6 G/DL (ref 30–36.5)
MCV RBC AUTO: 89.2 FL (ref 80–99)
MONOCYTES # BLD: 0.2 K/UL (ref 0–1)
MONOCYTES NFR BLD: 3 % (ref 5–13)
NEUTS SEG # BLD: 7.2 K/UL (ref 1.8–8)
NEUTS SEG NFR BLD: 92 % (ref 32–75)
NRBC # BLD: 0 K/UL (ref 0–0.01)
NRBC BLD-RTO: 0 PER 100 WBC
PLATELET # BLD AUTO: 202 K/UL (ref 150–400)
PMV BLD AUTO: 9.6 FL (ref 8.9–12.9)
POTASSIUM SERPL-SCNC: 3.9 MMOL/L (ref 3.5–5.1)
PROT SERPL-MCNC: 7.8 G/DL (ref 6.4–8.2)
RBC # BLD AUTO: 3.61 M/UL (ref 3.8–5.2)
SODIUM SERPL-SCNC: 133 MMOL/L (ref 136–145)
UFH PPP CHRO-ACNC: <0.1 IU/ML
WBC # BLD AUTO: 7.8 K/UL (ref 3.6–11)

## 2023-08-09 PROCEDURE — 85025 COMPLETE CBC W/AUTO DIFF WBC: CPT

## 2023-08-09 PROCEDURE — 6370000000 HC RX 637 (ALT 250 FOR IP): Performed by: INTERNAL MEDICINE

## 2023-08-09 PROCEDURE — 36415 COLL VENOUS BLD VENIPUNCTURE: CPT

## 2023-08-09 PROCEDURE — 71045 X-RAY EXAM CHEST 1 VIEW: CPT

## 2023-08-09 PROCEDURE — 80053 COMPREHEN METABOLIC PANEL: CPT

## 2023-08-09 PROCEDURE — 6360000002 HC RX W HCPCS: Performed by: FAMILY MEDICINE

## 2023-08-09 PROCEDURE — 85520 HEPARIN ASSAY: CPT

## 2023-08-09 PROCEDURE — 94640 AIRWAY INHALATION TREATMENT: CPT

## 2023-08-09 RX ORDER — HYDROXYZINE HYDROCHLORIDE 50 MG/ML
25 INJECTION, SOLUTION INTRAMUSCULAR ONCE
Status: COMPLETED | OUTPATIENT
Start: 2023-08-09 | End: 2023-08-09

## 2023-08-09 RX ORDER — HYDROXYZINE HYDROCHLORIDE 50 MG/ML
50 INJECTION, SOLUTION INTRAMUSCULAR EVERY 6 HOURS PRN
Status: DISCONTINUED | OUTPATIENT
Start: 2023-08-09 | End: 2023-08-09

## 2023-08-09 RX ADMIN — IPRATROPIUM BROMIDE AND ALBUTEROL SULFATE 1 DOSE: 2.5; .5 SOLUTION RESPIRATORY (INHALATION) at 01:47

## 2023-08-09 RX ADMIN — METHYLPREDNISOLONE SODIUM SUCCINATE 40 MG: 40 INJECTION, POWDER, FOR SOLUTION INTRAMUSCULAR; INTRAVENOUS at 04:35

## 2023-08-09 RX ADMIN — HYDROXYZINE HYDROCHLORIDE 25 MG: 50 INJECTION, SOLUTION INTRAMUSCULAR at 06:04

## 2023-08-09 ASSESSMENT — PAIN SCALES - GENERAL
PAINLEVEL_OUTOF10: 0
PAINLEVEL_OUTOF10: 0

## 2023-08-09 NOTE — PROGRESS NOTES
RN came in to assess patient this morning during bedside shift report and found patient to be visibly upset. Patient requesting to have night time nurse removed from the room. Patient expressing wishes to go home. Patient refusing to get in bed, removed all medical equipment and began to swing the phone at staff members. Patient verbally threatening staff members, code ATLAS called with prompt security response. Patient then ripped wires off of the bedside monitor. Patient counseled on dangers of leaving at this time. Patient verbalized that she knew it was her right to leave and requested to sign against medical advice papers at this time. Erwin called at this time to arrange transportation home. Patient expressed wishes to just take a cab home and not wait, but ultimately was agreeable to wait downstairs in the lobby. Thad Cope on his way to the hospital at this time. Patient escorted downstairs with security, nursing supervisor, and RN.     6560: Patient picked up by oneida Ruth at this time. 0830: Patient belongings found in the room. RN called erwin Ramirez to ask her to come retrieve the belongings at her convenience.

## 2023-08-09 NOTE — DISCHARGE SUMMARY
History and Physical    NAME:   Digna Buchanan   :  1940   MRN:  [id number]     Date/Time: 2023 10:48 AM    Patient PCP: Jose David Resendez MD  ______________________________________________________________________       Subjective:     CHIEF COMPLAINT:     Hemoptysis      HISTORY OF PRESENT ILLNESS:       Patient is a 82y.o. year old female past medical history of COPD type 2 diabetes hypertension CAD status post CABG on chronic oxygen 2 L at home smokes cigarettes came to emergency room because of coughing up blood  Patient denies any chest pain shortness of breath nausea vomiting diarrhea constipation no fever no chills        Patient laying comfortably in bed, coughing with deep breaths on auscultation, non-productive at this time. Currently she just feels tired, weak, and hungry because she has not eaten since 11am yesterday. NPO until at least 11am for VQ scan . Denies CP, SOB, nausea, vomiting, diarrhea, fever, chills. Additional history: Patient woke up with cough 2 days ago, productive with bright red blood. No sick contacts. Reports she had similar symptoms 30 years ago, which resolved w/o treatment. Diagnosed with COPD 1 year ago. Hgb: 11.5, stable  WBC: 9.1    Chest CT w/o Contrast:  1. Background of centrilobular and paraseptal emphysema. 2.  Focal opacities in the right upper lobe and left apex may represent an  infectious process. Recommend short-term follow-up chest CT in 3 months. 3.  Small right pleural effusion.  Probable loculated left effusion with  bibasilar volume loss      Past Medical History:   Diagnosis Date    CAD (coronary artery disease)     Chronic obstructive pulmonary disease (HCC)     Diabetes ([address])     Diabetes mellitus ([address])     Essential hypertension     Gastroesophageal reflux disease     Heart failure (HCC)     Hyperlipidemia     Hypertension     Hypothyroid     Myocardial infarction ([address])     Osteoarthritis     Peptic ulcer disease     Tobacco use RDW 15.1 (H) 11.5 - 14.5 %    Platelets 894 616 - 788 K/uL    MPV 9.6 8.9 - 12.9 FL    Nucleated RBCs 0.0 0.0  WBC    nRBC 0.00 0.00 - 0.01 K/uL    Neutrophils % 92 (H) 32 - 75 %    Lymphocytes % 4 (L) 12 - 49 %    Monocytes % 3 (L) 5 - 13 %    Eosinophils % 0 0 - 7 %    Basophils % 0 0 - 1 %    Immature Granulocytes 1 (H) 0 - 0.5 %    Neutrophils Absolute 7.2 1.8 - 8.0 K/UL    Lymphocytes Absolute 0.3 (L) 0.8 - 3.5 K/UL    Monocytes Absolute 0.2 0.0 - 1.0 K/UL    Eosinophils Absolute 0.0 0.0 - 0.4 K/UL    Basophils Absolute 0.0 0.0 - 0.1 K/UL    Absolute Immature Granulocyte 0.0 0.00 - 0.04 K/UL    Differential Type AUTOMATED     Comprehensive Metabolic Panel    Collection Time: 08/09/23  3:40 AM   Result Value Ref Range    Sodium 133 (L) 136 - 145 mmol/L    Potassium 3.9 3.5 - 5.1 mmol/L    Chloride 99 97 - 108 mmol/L    CO2 25 21 - 32 mmol/L    Anion Gap 9 5 - 15 mmol/L    Glucose 116 (H) 65 - 100 mg/dL    BUN 48 (H) 6 - 20 mg/dL    Creatinine 3.20 (H) 0.55 - 1.02 mg/dL    Bun/Cre Ratio 15 12 - 20      Est, Glom Filt Rate 14 (L) >60 ml/min/1.73m2    Calcium 8.4 (L) 8.5 - 10.1 mg/dL    Total Bilirubin 0.4 0.2 - 1.0 mg/dL    AST 12 (L) 15 - 37 U/L    ALT 18 12 - 78 U/L    Alk Phosphatase 129 (H) 45 - 117 U/L    Total Protein 7.8 6.4 - 8.2 g/dL    Albumin 3.4 (L) 3.5 - 5.0 g/dL    Globulin 4.4 (H) 2.0 - 4.0 g/dL    Albumin/Globulin Ratio 0.8 (L) 1.1 - 2.2             Xray Result (most recent):  XR CHEST PORTABLE    Result Date: 8/7/2023  1. Diffuse interstitial and airspace opacities likely due to pulmonary edema with small left pleural effusion. 2. Possible nodular densities both upper lung zones, consider CT. XR CHEST 1 VIEW   Final Result   Patchy bilateral upper lobe nodular opacities and background of interstitial   disease similar to prior study. Left pleural effusion/pleural thickening   unchanged. NM LUNG SCAN PERFUSION ONLY   Final Result   Low probability for pulmonary embolus.       CT

## 2023-08-09 NOTE — PROGRESS NOTES
IMPRESSION:   Acute on chronic hypoxic respiratory failure  History of mitral valve regurgitation aortic valve regurgitation vascular disease coronary arterial sclerosis history of coronary artery bypass grafting  Hemoptysis/hematemesis  Aspiration pneumonia  GI bleed  Tobacco abuse  History of peptic ulcer disease  Pt is critically ill. Time spent with pt and staff actively rendering care, managing pt and coordinating care as stated below; 30 minutes, exclusive of any procedures      RECOMMENDATIONS/PLAN:   ICU monitoring  82-year-old lady came in for possible hemoptysis versus hematemesis  Chronically on home oxygen  CAT scan of the chest done which showed a right upper lobe left apical infiltrate small right pleural effusion loculated most likely  Elevated BNP chest x-ray shows CHF congestive changes with history of coronary artery disease patient follows with cardiologist as an outpatient as patient history of mitral and aortic valve regurgitation history of CABG in the past  Hemoglobin is stable we will continue to follow  IV Solu-Medrol nebulizer treatment along with Pepcid   Agree with Empiric IV antibiotics pending culture results   Follow culture results on Levaquin  Continue with Lasix  Patient is agitated threatening the nurses she wanted to sign out AMA   Will be available to assist in medical management while in the CCU pending disposition     [x] High complexity decision making was performed  [x] See my orders for details  HPI  82-year-old lady came in as she was transferred from freestanding emergency room because of possible hemoptysis or hematemesis past medical history of COPD chronically on home oxygen 2 L nasal cannula continue to smoke, diabetes mellitus hypertension coronary disease she is weak and tired.   She had large volume hematemesis so she was admitted to ICU hemodynamically unstable    PMH:  has a past medical history of CAD (coronary artery disease), Chronic obstructive pulmonary MD Alex   40 mg at 08/08/23 2273    hydrALAZINE (APRESOLINE) tablet 100 mg  100 mg Oral TID Dunia Gavin MD   100 mg at 08/08/23 2102    levothyroxine (SYNTHROID) tablet 100 mcg  100 mcg Oral QAM AC Emma Johnson MD   100 mcg at 08/08/23 7238    liothyronine (CYTOMEL) tablet 5 mcg  5 mcg Oral Daily Kenroy Johnson MD   5 mcg at 08/08/23 0902    insulin lispro (HUMALOG) injection vial 0-16 Units  0-16 Units SubCUTAneous TID WC Emma Johnson MD        insulin lispro (HUMALOG) injection vial 0-4 Units  0-4 Units SubCUTAneous Nightly Emma Johnson MD        glucose chewable tablet 16 g  4 tablet Oral PRN Kenroy Johnson MD        dextrose bolus 10% 125 mL  125 mL IntraVENous PRN Dunia Gavin MD        Or    dextrose bolus 10% 250 mL  250 mL IntraVENous PRN Kenroy Johnson MD        glucagon (rDNA) injection 1 mg  1 mg SubCUTAneous PRN Dunia Gavin MD        dextrose 10 % infusion   IntraVENous Continuous PRN Emma Johnson MD        sodium chloride flush 0.9 % injection 5-40 mL  5-40 mL IntraVENous 2 times per day Dunia Gavin MD   10 mL at 08/08/23 2059    sodium chloride flush 0.9 % injection 5-40 mL  5-40 mL IntraVENous PRN Dunia Gavin MD   10 mL at 08/08/23 0446    0.9 % sodium chloride infusion   IntraVENous PRN Kenroy Johnson MD        ondansetron (ZOFRAN-ODT) disintegrating tablet 4 mg  4 mg Oral Q8H PRN Dunia Gavin MD        Or    ondansetron [facility]) injection 4 mg  4 mg IntraVENous Q6H PRN Kenroy Johnson MD   4 mg at 08/08/23 1220    polyethylene glycol (GLYCOLAX) packet 17 g  17 g Oral Daily PRN Kenroy Johnson MD        acetaminophen (TYLENOL) tablet 650 mg  650 mg Oral Q6H PRN Emma Johnson MD   650 mg at 08/08/23 1220    Or    acetaminophen (TYLENOL) suppository 650 mg  650 mg Rectal Q6H PRN Kenroy Johnson MD        methylPREDNISolone sodium (PF) (SOLU-MEDROL PF) injection 40 mg  40 mg IntraVENous Q6H Emma Johnson MD   40 mg at 08/09/23 3866

## 2023-08-11 NOTE — PROGRESS NOTES
Physician Progress Note      PATIENTClassie Favre  CSN #:                  [id number]  :                       1940  ADMIT DATE:       2023 5:35 PM  [address] [place] [address] DATE:        2023 8:53 AM  RESPONDING  PROVIDER #:        Bee Mason MD          QUERY TEXT:    Dr Randa Trotter,  Thank you for your query response, the query requesting the specificity of   CHF. Ignacio Cassidy RN, CDI    Pt admitted with hematemesis and has CHF documented. If possible, please   document in progress notes and discharge summary further specificity regarding   the type and acuity of CHF:      The medical record reflects the following:  Risk Factors: 81 yo female with PMH CHF, CAD, COPD    Clinical Indicators: PMH with CHF  BNP 8,543  CXR with Diffuse interstitial and airspace opacities likely due to pulmonary   edema, with small left pleural effusion  Home meds include Lasix 40 mg daily  ECHO 11/15/21 LVEF 60- 65%  Diffuse interstitial and airspace opacities likely due to pulmonary edema with   small left pleural effusion    Treatment: Lasix on in-house meds, Pulmonary consult, serial labs, telemetry   monitoring      Thank you for your time,    Ignacio GORE, RN, [address] [place], [facility] [address], [facility] [address]  C: [phone]    [email or web address]  Options provided:  -- Acute on Chronic Diastolic CHF/HFpEF  -- Acute Diastolic CHF/HFpEF  -- Chronic Diastolic CHF/HFpEF  -- Other - I will add my own diagnosis  -- Disagree - Not applicable / Not valid  -- Disagree - Clinically unable to determine / Unknown  -- Refer to Clinical Documentation Reviewer    PROVIDER RESPONSE TEXT:    This patient is in acute on chronic diastolic CHF/HFpEF.     Query created by: Ignacio Cassidy on 2023 12:21 PM      Electronically signed by:  Bee Mason MD 2023 12:49 PM